# Patient Record
Sex: FEMALE | Race: WHITE | NOT HISPANIC OR LATINO | Employment: FULL TIME | URBAN - METROPOLITAN AREA
[De-identification: names, ages, dates, MRNs, and addresses within clinical notes are randomized per-mention and may not be internally consistent; named-entity substitution may affect disease eponyms.]

---

## 2023-02-02 ENCOUNTER — TRANSFERRED RECORDS (OUTPATIENT)
Dept: MULTI SPECIALTY CLINIC | Facility: CLINIC | Age: 23
End: 2023-02-02
Payer: COMMERCIAL

## 2023-02-02 LAB
C TRACH DNA SPEC QL PROBE+SIG AMP: NEGATIVE
HEP C HIM: NORMAL
HIV 1&2 EXT: NORMAL
N GONORRHOEA DNA SPEC QL PROBE+SIG AMP: NEGATIVE
SPECIMEN DESCRIP: NORMAL
SPECIMEN DESCRIPTION: NORMAL

## 2023-02-06 NOTE — PROGRESS NOTES
"  Assessment & Plan     1. Deviated nasal septum    - Adult ENT  Referral; Future    2. Initiation of OCP (BCP)    - desogestrel-ethinyl estradiol (APRI) 0.15-30 MG-MCG tablet; Take 1 tablet by mouth daily  Dispense: 84 tablet; Refill: 3   The risks, benefits and treatment options of prescribed medications or other treatments have been discussed with the patient. The patient verbalized their understanding and should call or follow up if no improvement or if they develop further problems   Patient is given an opportunity to ask questions and have them answered.    3. Advance care planning      4. Encounter for administration of vaccine  Flu shot      Return in about 4 weeks (around 3/7/2023) for Follow up, Routine preventive, in person.    Beatriz Alfaro MD  [facility] MAYANK Cross is a 23 year old, presenting for the following health issues:  Breathing Problem and Contraception      History of Present Illness       Reason for visit:  Deviated septum, ingrown nail       Description: deviated septum  Duration: ongoing  Treatments tried: none    Description: birth control  Has not been on medication in the past. Would like to discuss options     day1 of period today. She is sexually active and uses condom. She denies h/o of std. She does not smoke tobacco . She denies a history of migraine with aura or DVT  .         Please abstract the following data from this visit with this patient into the appropriate field in Epic:      Other Tests found in the patient's chart through Chart Review/Care Everywhere:    Chlamydia testing done on this date: 1999                  Review of Systems   Constitutional, HEENT, cardiovascular, pulmonary, gi and gu systems are negative, except as otherwise noted.      Objective    /74   Pulse 80   Temp 98.6  F (37  C) (Temporal)   Resp 16   Ht 1.66 m (5' 5.35\")   Wt 71.7 kg (158 lb)   LMP 02/07/2023 (Exact Date)   SpO2 100%   " BMI 26.01 kg/m    Body mass index is 26.01 kg/m .  Physical Exam   GENERAL: healthy, alert and no distress  NOSE: deviated septum to the left  RESP: lungs clear to auscultation - no rales, rhonchi or wheezes  CV: regular rate and rhythm, normal S1 S2,   ABDOMEN: soft, nontender, no hepatosplenomegaly, no masses and bowel sounds normal  MS: no gross musculoskeletal defects noted, no edema

## 2023-02-07 ENCOUNTER — OFFICE VISIT (OUTPATIENT)
Dept: FAMILY MEDICINE | Facility: CLINIC | Age: 23
End: 2023-02-07
Payer: COMMERCIAL

## 2023-02-07 VITALS
WEIGHT: 158 LBS | RESPIRATION RATE: 16 BRPM | BODY MASS INDEX: 26.33 KG/M2 | SYSTOLIC BLOOD PRESSURE: 130 MMHG | TEMPERATURE: 98.6 F | HEART RATE: 80 BPM | DIASTOLIC BLOOD PRESSURE: 74 MMHG | OXYGEN SATURATION: 100 % | HEIGHT: 65 IN

## 2023-02-07 DIAGNOSIS — Z71.89 ADVANCE CARE PLANNING: ICD-10-CM

## 2023-02-07 DIAGNOSIS — Z23 ENCOUNTER FOR ADMINISTRATION OF VACCINE: ICD-10-CM

## 2023-02-07 DIAGNOSIS — Z30.011 INITIATION OF OCP (BCP): Primary | ICD-10-CM

## 2023-02-07 DIAGNOSIS — J34.2 DEVIATED NASAL SEPTUM: ICD-10-CM

## 2023-02-07 PROCEDURE — 90686 IIV4 VACC NO PRSV 0.5 ML IM: CPT | Performed by: STUDENT IN AN ORGANIZED HEALTH CARE EDUCATION/TRAINING PROGRAM

## 2023-02-07 PROCEDURE — 99203 OFFICE O/P NEW LOW 30 MIN: CPT | Mod: 25 | Performed by: STUDENT IN AN ORGANIZED HEALTH CARE EDUCATION/TRAINING PROGRAM

## 2023-02-07 PROCEDURE — 90471 IMMUNIZATION ADMIN: CPT | Performed by: STUDENT IN AN ORGANIZED HEALTH CARE EDUCATION/TRAINING PROGRAM

## 2023-02-07 RX ORDER — DESOGESTREL AND ETHINYL ESTRADIOL 0.15-0.03
1 KIT ORAL DAILY
Qty: 84 TABLET | Refills: 3 | Status: SHIPPED | OUTPATIENT
Start: 2023-02-07 | End: 2024-01-17

## 2023-02-07 NOTE — Clinical Note
Please abstract the following data from this visit with this patient into the appropriate field in Epic:  Tests that can be patient reported without a hard copy:  {Quality Abstract List (Optional):623385}  Other Tests found in the patient's chart through Chart Review/Care Everywhere:  Chlamydia testing done on this date: hiv, hep c, g/c done by this group [facility] [place] on this date: 2/2/23  Note to Abstraction: If this section is blank, no results were found via Chart Review/Care Everywhere.

## 2023-05-09 ENCOUNTER — OFFICE VISIT (OUTPATIENT)
Dept: OTOLARYNGOLOGY | Facility: CLINIC | Age: 23
End: 2023-05-09
Payer: COMMERCIAL

## 2023-05-09 VITALS
RESPIRATION RATE: 16 BRPM | OXYGEN SATURATION: 100 % | DIASTOLIC BLOOD PRESSURE: 82 MMHG | HEART RATE: 83 BPM | SYSTOLIC BLOOD PRESSURE: 128 MMHG

## 2023-05-09 DIAGNOSIS — J34.2 NASAL SEPTAL DEVIATION: ICD-10-CM

## 2023-05-09 DIAGNOSIS — M95.0 NASAL DEFORMITY: ICD-10-CM

## 2023-05-09 DIAGNOSIS — J34.89 NASAL OBSTRUCTION: Primary | ICD-10-CM

## 2023-05-09 PROCEDURE — 99243 OFF/OP CNSLTJ NEW/EST LOW 30: CPT | Performed by: OTOLARYNGOLOGY

## 2023-05-09 NOTE — PROGRESS NOTES
I am seeing this patient in consultation for deviated nasal septum at the request of the provider Beatriz Vera    Chief Complaint - Nasal obstruction    History of Present Illness - America Estrada is a 23 year old female who presents for evaluation of nasal obstruction. The patient describes symptoms of left nasal obstruction for the past many years. The patient notes symptoms most predominately on the left side, and if she opens left nostril it helps. The patient notes no allergies. No history of nasal trauma. No prior history of nasal surgery. I personally reviewed the relevant clinical notes in Epic including the primary care providers note.     Past Medical History - healthy    Current Medications -   Current Outpatient Medications:      desogestrel-ethinyl estradiol (APRI) 0.15-30 MG-MCG tablet, Take 1 tablet by mouth daily, Disp: 84 tablet, Rfl: 3    Allergies - No Known Allergies    Social History -   Social History     Socioeconomic History     Marital status: Single   Tobacco Use     Smoking status: Never     Smokeless tobacco: Never   Vaping Use     Vaping status: Never Used     Review of Systems - As per HPI and PMHx, otherwise 7 system review of the head and neck is negative.      Physical Exam  General - The patient is in no distress. Alert and oriented to person and place, answers questions and cooperates with examination appropriately.   Neurologic - CN II-XII are grossly intact. No focal neurologic deficits.   Voice and Breathing - The patient was breathing comfortably without the use of accessory muscles. There was no wheezing, stridor, or stertor.  The patients voice was clear and strong.  Eyes - Extraocular movements intact. Sclera were not icteric or injected, conjunctiva were pink and moist.  Mouth - Examination of the oral cavity showed pink, healthy oral mucosa. No lesions or ulcerations noted.  The tongue was mobile and midline, and the dentition were in good condition.     Throat - The walls of the oropharynx were smooth, pink, moist, symmetric, and had no lesions or ulcerations.  The tonsillar pillars and soft palate were symmetric.  The uvula was midline on elevation.  Neck -  No erythema, masses or lymphadenopathy  Nose - External contour shows a caudal deviation, severe to the left causing the medial crura of the lower lateral cartilage to be displaced. She has a very narrow left external valve. Nasal mucosa is pink and moist with clear mucus. The turbinates are normal.  No polyps, masses, or purulence noted on examination.      A/P - America Estrada is a 23 year old female with nasal obstruction due to a nasal tip deformity and a caudal septal deviation to the left. This is too severe for medical treatment to help. She has no allergies or edema. I recommend surgery. However, given that the tip is involved and her lower lateral cartilage is displaced, this may require septorhinoplasty. I will refer her to Dr. Evans, facial plastics at the  of .     Smith Bruce MD  Otolaryngology  [facility]

## 2023-05-09 NOTE — LETTER
5/9/2023         RE: America Estrada  [address]  [place] [address]        Dear Colleague,    Thank you for referring your patient, America Estrada, to the [facility]. Please see a copy of my visit note below.    I am seeing this patient in consultation for deviated nasal septum at the request of the provider Beatriz Vera    Chief Complaint - Nasal obstruction    History of Present Illness - America Estrada is a 23 year old female who presents for evaluation of nasal obstruction. The patient describes symptoms of left nasal obstruction for the past many years. The patient notes symptoms most predominately on the left side, and if she opens left nostril it helps. The patient notes no allergies. No history of nasal trauma. No prior history of nasal surgery. I personally reviewed the relevant clinical notes in Epic including the primary care providers note.     Past Medical History - healthy    Current Medications -   Current Outpatient Medications:      desogestrel-ethinyl estradiol (APRI) 0.15-30 MG-MCG tablet, Take 1 tablet by mouth daily, Disp: 84 tablet, Rfl: 3    Allergies - No Known Allergies    Social History -   Social History     Socioeconomic History     Marital status: Single   Tobacco Use     Smoking status: Never     Smokeless tobacco: Never   Vaping Use     Vaping status: Never Used     Review of Systems - As per HPI and PMHx, otherwise 7 system review of the head and neck is negative.      Physical Exam  General - The patient is in no distress. Alert and oriented to person and place, answers questions and cooperates with examination appropriately.   Neurologic - CN II-XII are grossly intact. No focal neurologic deficits.   Voice and Breathing - The patient was breathing comfortably without the use of accessory muscles. There was no wheezing, stridor, or stertor.  The patients voice was clear and strong.  Eyes - Extraocular movements intact. Sclera were not icteric  or injected, conjunctiva were pink and moist.  Mouth - Examination of the oral cavity showed pink, healthy oral mucosa. No lesions or ulcerations noted.  The tongue was mobile and midline, and the dentition were in good condition.    Throat - The walls of the oropharynx were smooth, pink, moist, symmetric, and had no lesions or ulcerations.  The tonsillar pillars and soft palate were symmetric.  The uvula was midline on elevation.  Neck -  No erythema, masses or lymphadenopathy  Nose - External contour shows a caudal deviation, severe to the left causing the medial crura of the lower lateral cartilage to be displaced. She has a very narrow left external valve. Nasal mucosa is pink and moist with clear mucus. The turbinates are normal.  No polyps, masses, or purulence noted on examination.      A/P - America Estrada is a 23 year old female with nasal obstruction due to a nasal tip deformity and a caudal septal deviation to the left. This is too severe for medical treatment to help. She has no allergies or edema. I recommend surgery. However, given that the tip is involved and her lower lateral cartilage is displaced, this may require septorhinoplasty. I will refer her to Dr. Evans, facial plastics at the  of .     Smith Bruce MD  Otolaryngology  [facility]        Again, thank you for allowing me to participate in the care of your patient.        Sincerely,        Smith Bruce MD

## 2023-06-21 ENCOUNTER — NURSE TRIAGE (OUTPATIENT)
Dept: FAMILY MEDICINE | Facility: CLINIC | Age: 23
End: 2023-06-21
Payer: COMMERCIAL

## 2023-06-21 NOTE — TELEPHONE ENCOUNTER
"S-(situation): patient called for an appointment today for heart rate issues. Last happened 45 minutes ago, and she is not having symptoms now.    B-(background): patient stated that over the last 6 months she has had issues with her heart. She describes it as \"sometimes it feels like palpitations, sometimes it feels like it is racing, sometimes it feels like a \"jolt\", where it will stop her in her tracks, sometimes it is irregular beats.    A-(assessment): see above.  Patient stated that this occurs about 4 times per week or so, and can be about 2-3 x per day when it happens.  The jolt feeling lasts a second, the rest of her symptoms can be a few seconds to several minutes.  Sometimes if feels like the symptoms will stop, and then start again, then stop, and than starts again.  When this occurs, she stated that sometimes she has \"fuzzy\" vision, and tingling in her extremities.       R-(recommendations): Per protocol: patient to be seen in clinic today.  There are not any openings.  I advised patient of the symptoms of when to call 911, and go to ER.  See protocols below.  Also, advised patient if this happens again, to ER so they may catch this when it is happening.    No openings in clinic today.  Routed as a level 2 triage to PCP for advise.    Chaya LEAL BSN  Triage Nurse  [facility]: [facility]  Ph: [phone]            "

## 2023-06-21 NOTE — TELEPHONE ENCOUNTER
Reason for Disposition    Heart beating very rapidly (e.g., > 140 / minute) and not present now  (Exception: During exercise.)    Skipped or extra beat(s) and occurs 4 or more times per minute    Additional Information    Negative: Passed out (i.e., lost consciousness, collapsed and was not responding)    Negative: Shock suspected (e.g., cold/pale/clammy skin, too weak to stand, low BP, rapid pulse)     Advised patient to call 911 if these symptoms occur.  Patient stated understanding and agreeable with the plan of care.    Negative: Difficult to awaken or acting confused (e.g., disoriented, slurred speech)     Advised patient to call 911 if these symptoms occur.  Patient stated understanding and agreeable with the plan of care.    Negative: Visible sweat on face or sweat dripping down face     Advised patient to call 911 if these symptoms occur.  Patient stated understanding and agreeable with the plan of care.    Negative: Unable to walk, or can only walk with assistance (e.g., requires support)     Advised patient to call 911 if these symptoms occur.  Patient stated understanding and agreeable with the plan of care.    Negative: Dizziness, lightheadedness, or weakness and heart beating very rapidly (e.g., > 140 / minute)     Advised patient to call 911 if these symptoms occur.  Patient stated understanding and agreeable with the plan of care.    Negative: Dizziness, lightheadedness, or weakness and heart beating very slowly (e.g., < 50 / minute)     Advised patient to call 911 if these symptoms occur.  Patient stated understanding and agreeable with the plan of care.    Negative: Sounds like a life-threatening emergency to the triager    Negative: Chest pain     Advised patient to call 911 if these symptoms occur.  Patient stated understanding and agreeable with the plan of care.    Negative: Difficulty breathing     Advised patient to go to ER now if these symptoms occur.  Patient stated understanding and  "agreeable with the plan of care.    Negative: Dizziness, lightheadedness, or weakness     Advised patient to go to ER now if these symptoms occur.  Patient stated understanding and agreeable with the plan of care.    Negative: Heart beating very rapidly (e.g., > 140 / minute) and present now  (Exception: During exercise.)     Advised patient to go to ER now if these symptoms occur.  Patient stated understanding and agreeable with the plan of care.    Negative: Heart beating very slowly (e.g., < 50 / minute)  (Exception: Athlete and heart rate normal for caller.)     Advised patient to go to ER now if these symptoms occur.  Patient stated understanding and agreeable with the plan of care.    Negative: New or worsened shortness of breath with activity (dyspnea on exertion)     Advised patient to go to ER now if these symptoms occur.  Patient stated understanding and agreeable with the plan of care.    Negative: Patient sounds very sick or weak to the triager    Negative: Taking water pill (i.e., diuretic) or heart medication (e.g., digoxin)    Negative: History of hyperthyroidism or taking thyroid medication    Negative: Substance use (drug use) or misuse, known or suspected    Negative: ADHD and taking stimulant medication    Answer Assessment - Initial Assessment Questions  1. DESCRIPTION: \"Please describe your heart rate or heartbeat that you are having\" (e.g., fast/slow, regular/irregular, skipped or extra beats, \"palpitations\")      Sometimes it is a skipped beat, sometimes it feels like it is racing, sometimes it feels like a \"jolt\", where it will stop her in her tracks, sometimes it is irregular  2. ONSET: \"When did it start?\" (Minutes, hours or days)       Symptoms started around past 6 months  3. DURATION: \"How long does it last\" (e.g., seconds, minutes, hours)      A second to a couple of minutes  4. PATTERN \"Does it come and go, or has it been constant since it started?\"  \"Does it get worse with exertion?\"  " " \"Are you feeling it now?\"      See above.  I am not feeling it now, but it happened this morning at about 45 minutes ago  5. TAP: \"Using your hand, can you tap out what you are feeling on a chair or table in front of you, so that I can hear?\" (Note: not all patients can do this)        Heart rate is normal mow  6. HEART RATE: \"Can you tell me your heart rate?\" \"How many beats in 15 seconds?\"  (Note: not all patients can do this)        88  7. RECURRENT SYMPTOM: \"Have you ever had this before?\" If Yes, ask: \"When was the last time?\" and \"What happened that time?\"       For the last 6 months  8. CAUSE: \"What do you think is causing the palpitations?\"      No idea  9. CARDIAC HISTORY: \"Do you have any history of heart disease?\" (e.g., heart attack, angina, bypass surgery, angioplasty, arrhythmia)       denies  10. OTHER SYMPTOMS: \"Do you have any other symptoms?\" (e.g., dizziness, chest pain, sweating, difficulty breathing)        Sometimes I have fuzzy vision, and tingling in my fingers  11. PREGNANCY: \"Is there any chance you are pregnant?\" \"When was your last menstrual period?\"        denies    Protocols used: HEART RATE AND HEARTBEAT [id number]      "

## 2023-06-21 NOTE — TELEPHONE ENCOUNTER
Patient needs to be seen in the clinic today. If there is no available spot in the clinic today, I will recommend going to the ED symptom recurs.   She can also wait till tomorrow if symptom is stable.

## 2023-06-21 NOTE — TELEPHONE ENCOUNTER
There are not any available appointments in surrounding clinics today.    Called patient, advised she can go to  today, or wait until tomorrow for an appointment.  Patient would like an appointment tomorrow.  PCP is not in clinic tomorrow. Scheduled with Natacha Banks PA-C at arrival time of 10:20.  Advised patient if any symptoms reoccur today, go to ER for an evaluation.    Patient stated understanding and agreeable with the plan of care.     Chaya LEAL BSN  Triage Nurse  [facility]: [facility]

## 2023-06-22 ENCOUNTER — OFFICE VISIT (OUTPATIENT)
Dept: FAMILY MEDICINE | Facility: CLINIC | Age: 23
End: 2023-06-22
Payer: COMMERCIAL

## 2023-06-22 ENCOUNTER — ANCILLARY PROCEDURE (OUTPATIENT)
Dept: CARDIOLOGY | Facility: CLINIC | Age: 23
End: 2023-06-22
Attending: PHYSICIAN ASSISTANT
Payer: COMMERCIAL

## 2023-06-22 VITALS
TEMPERATURE: 97.7 F | RESPIRATION RATE: 22 BRPM | HEART RATE: 73 BPM | WEIGHT: 156.6 LBS | BODY MASS INDEX: 25.17 KG/M2 | DIASTOLIC BLOOD PRESSURE: 68 MMHG | OXYGEN SATURATION: 100 % | HEIGHT: 66 IN | SYSTOLIC BLOOD PRESSURE: 112 MMHG

## 2023-06-22 DIAGNOSIS — R00.2 PALPITATIONS: ICD-10-CM

## 2023-06-22 DIAGNOSIS — R00.2 PALPITATIONS: Primary | ICD-10-CM

## 2023-06-22 LAB
ANION GAP SERPL CALCULATED.3IONS-SCNC: 11 MMOL/L (ref 7–15)
BUN SERPL-MCNC: 11.4 MG/DL (ref 6–20)
CALCIUM SERPL-MCNC: 9.2 MG/DL (ref 8.6–10)
CHLORIDE SERPL-SCNC: 105 MMOL/L (ref 98–107)
CREAT SERPL-MCNC: 0.65 MG/DL (ref 0.51–0.95)
DEPRECATED HCO3 PLAS-SCNC: 24 MMOL/L (ref 22–29)
ERYTHROCYTE [DISTWIDTH] IN BLOOD BY AUTOMATED COUNT: 12.7 % (ref 10–15)
GFR SERPL CREATININE-BSD FRML MDRD: >90 ML/MIN/1.73M2
GLUCOSE SERPL-MCNC: 91 MG/DL (ref 70–99)
HCT VFR BLD AUTO: 39.8 % (ref 35–47)
HGB BLD-MCNC: 12.9 G/DL (ref 11.7–15.7)
MCH RBC QN AUTO: 29.5 PG (ref 26.5–33)
MCHC RBC AUTO-ENTMCNC: 32.4 G/DL (ref 31.5–36.5)
MCV RBC AUTO: 91 FL (ref 78–100)
PLATELET # BLD AUTO: 254 10E3/UL (ref 150–450)
POTASSIUM SERPL-SCNC: 4.5 MMOL/L (ref 3.4–5.3)
RBC # BLD AUTO: 4.38 10E6/UL (ref 3.8–5.2)
SODIUM SERPL-SCNC: 140 MMOL/L (ref 136–145)
TSH SERPL DL<=0.005 MIU/L-ACNC: 1 UIU/ML (ref 0.3–4.2)
WBC # BLD AUTO: 4.7 10E3/UL (ref 4–11)

## 2023-06-22 PROCEDURE — 93241 XTRNL ECG REC>48HR<7D: CPT | Performed by: INTERNAL MEDICINE

## 2023-06-22 PROCEDURE — 84443 ASSAY THYROID STIM HORMONE: CPT | Performed by: PHYSICIAN ASSISTANT

## 2023-06-22 PROCEDURE — 93000 ELECTROCARDIOGRAM COMPLETE: CPT | Performed by: PHYSICIAN ASSISTANT

## 2023-06-22 PROCEDURE — 85027 COMPLETE CBC AUTOMATED: CPT | Performed by: PHYSICIAN ASSISTANT

## 2023-06-22 PROCEDURE — 80048 BASIC METABOLIC PNL TOTAL CA: CPT | Performed by: PHYSICIAN ASSISTANT

## 2023-06-22 PROCEDURE — 36415 COLL VENOUS BLD VENIPUNCTURE: CPT | Performed by: PHYSICIAN ASSISTANT

## 2023-06-22 PROCEDURE — 99214 OFFICE O/P EST MOD 30 MIN: CPT | Performed by: PHYSICIAN ASSISTANT

## 2023-06-22 ASSESSMENT — ENCOUNTER SYMPTOMS
PALPITATIONS: 1
COUGH: 0
SHORTNESS OF BREATH: 0
CHILLS: 0
WEAKNESS: 0
WHEEZING: 0
FEVER: 0

## 2023-06-22 ASSESSMENT — PAIN SCALES - GENERAL: PAINLEVEL: NO PAIN (0)

## 2023-06-22 NOTE — PROGRESS NOTES
Zio patch placed on patient in clinic on 06/22/2023. Patient was instructed to wear patch for 7 days per provider.     Went through instructions with patient regarding care, booklet documentation, returning device, and contacting [employer] with problems with device.     Patient agreed with plan and was sent home with instructions, brochure, log book and pre addressed return box. Regla Woodall MA

## 2023-06-22 NOTE — PROGRESS NOTES
Assessment & Plan     Palpitations  Patient is a 23-year-old female who presents to clinic with mother due to heart palpitations intermittently ongoing for 2 years.  Patient notes some palpitations feel more like a jolt and others feel like a rapid heartbeat.  Vital signs normal.  Physical exam without acute abnormalities.  EKG completed and without signs of WPW or other worrisome arrhythmias.  Will complete labs to make sure no underlying electrolyte abnormalities, anemia, thyroid abnormalities. Symptoms are most consistent with PVCs or possibly SVT.  Will complete Zio patch for further evaluation.  Return and urgent follow-up precautions provided.  - CBC with platelets; Future  - Basic metabolic panel  (Ca, Cl, CO2, Creat, Gluc, K, Na, BUN); Future  - TSH with free T4 reflex; Future  - EKG 12-lead complete w/read - Clinics  - Adult Leadless EKG Monitor 3 to 7 Days; Future  - CBC with platelets  - Basic metabolic panel  (Ca, Cl, CO2, Creat, Gluc, K, Na, BUN)  - TSH with free T4 reflex      See Patient Instructions    Natacha Banks PA-C  [employer] MAYANK Cross is a 23 year old, presenting for the following health issues:  Heart Problem (Patient state she is having some heart rate issues. Patient state that it is all the time. Not specific to activity or doing anything specific. )        6/22/2023    10:36 AM   Additional Questions   Roomed by Regla PERRY MA   Accompanied by Mom         6/22/2023    10:36 AM   Patient Reported Additional Medications   Patient reports taking the following new medications None     Heart Problem  Pertinent negatives include no chest pain, chills, coughing, fever or weakness.   History of Present Illness       Reason for visit:  Heart concerns  Symptom onset:  More than a month  Symptoms include:  Jolting heart and racing heart  Symptom intensity:  Moderate  Symptom progression:  Worsening  Had these symptoms before:  Yes  Has tried/received treatment for  "these symptoms:  No    She eats 2-3 servings of fruits and vegetables daily.She consumes 0 sweetened beverage(s) daily.She exercises with enough effort to increase her heart rate 20 to 29 minutes per day.  She exercises with enough effort to increase her heart rate 4 days per week.   She is taking medications regularly.       Patient notes that for the past few years she has had episodes of heart jolting happening 2-3 times per week and many times per day when it occurs. She also notes racing that happens rarely. At times her peripheral vision will feel off and she will have tingling in the fingers. Mother was told her had PVC's in the past. Brother had EKG done recently and possible valve issues. No intervention was completed. No sudden cardiac death in family.     Review of Systems   Constitutional: Negative for chills and fever.   Respiratory: Negative for cough, shortness of breath and wheezing.    Cardiovascular: Positive for palpitations. Negative for chest pain and peripheral edema.   Neurological: Negative for weakness.            Objective    /68   Pulse 73   Temp 97.7  F (36.5  C) (Temporal)   Resp 22   Ht 1.685 m (5' 6.34\")   Wt 71 kg (156 lb 9.6 oz)   LMP 06/22/2023 (Exact Date)   SpO2 100%   BMI 25.02 kg/m    Body mass index is 25.02 kg/m .  Physical Exam  Vitals and nursing note reviewed.   Constitutional:       General: She is not in acute distress.     Appearance: Normal appearance.   HENT:      Head: Normocephalic and atraumatic.      Mouth/Throat:      Mouth: Mucous membranes are moist.      Pharynx: Oropharynx is clear.   Eyes:      Extraocular Movements: Extraocular movements intact.      Pupils: Pupils are equal, round, and reactive to light.   Cardiovascular:      Rate and Rhythm: Normal rate and regular rhythm.      Heart sounds: Normal heart sounds. No murmur heard.  Pulmonary:      Effort: Pulmonary effort is normal.      Breath sounds: Normal breath sounds. No wheezing, " rhonchi or rales.   Musculoskeletal:         General: Normal range of motion.      Cervical back: Normal range of motion.      Right lower leg: No edema.      Left lower leg: No edema.   Skin:     General: Skin is warm and dry.   Neurological:      General: No focal deficit present.      Mental Status: She is alert.   Psychiatric:         Mood and Affect: Mood normal.         Behavior: Behavior normal.            EKG - Reviewed and interpreted by me appears normal, NSR, normal axis, normal intervals, no acute ST/T changes c/w ischemia, no LVH by voltage criteria, there are no prior tracings available

## 2023-06-22 NOTE — PATIENT INSTRUCTIONS
For further evaluation of your heart palpitations we are completing lab work, an EKG, and a Zio patch monitor.  We will send results via [employer] once available if you have this set up.  If there are worrisome findings we will call you.  If your findings are normal and you do not have MyChart, we will send the results in the mail.  Please see attached handout about palpitations at the end of the packet.  Reach out with any questions or concerns.

## 2023-07-10 ENCOUNTER — TELEPHONE (OUTPATIENT)
Dept: FAMILY MEDICINE | Facility: CLINIC | Age: 23
End: 2023-07-10
Payer: COMMERCIAL

## 2023-07-10 NOTE — TELEPHONE ENCOUNTER
Patient Quality Outreach    Patient is due for the following:   Cervical Cancer Screening - PAP Needed  Physical Preventive Adult Physical      Topic Date Due     HPV Vaccine (1 - 2-dose series) Never done     COVID-19 Vaccine (3 - Moderna series) 07/30/2021     Diptheria Tetanus Pertussis (DTAP/TDAP/TD) Vaccine (6 - Td or Tdap) 09/16/2021       Next Steps:   Schedule a Adult Preventative    Type of outreach:    Sent Scayl message.      Questions for provider review:    None           Regla Woodall MA

## 2023-08-13 ENCOUNTER — HEALTH MAINTENANCE LETTER (OUTPATIENT)
Age: 23
End: 2023-08-13

## 2023-08-25 ENCOUNTER — OFFICE VISIT (OUTPATIENT)
Dept: PLASTIC SURGERY | Facility: CLINIC | Age: 23
End: 2023-08-25
Payer: COMMERCIAL

## 2023-08-25 DIAGNOSIS — J34.89 NASAL OBSTRUCTION: ICD-10-CM

## 2023-08-25 DIAGNOSIS — J34.2 DEVIATED NASAL SEPTUM: Primary | ICD-10-CM

## 2023-08-25 DIAGNOSIS — J34.2 NASAL SEPTAL DEVIATION: ICD-10-CM

## 2023-08-25 DIAGNOSIS — J34.829 NASAL VALVE COLLAPSE: ICD-10-CM

## 2023-08-25 NOTE — PROGRESS NOTES
Facial Plastic and Reconstructive Surgery Consultation    Referring Provider:   Smith Bruce MD  [address] [facility] [place] [address]    HPI:   I had the pleasure of seeing America Estrada today in clinic for consultation for nasal obstruction with septal deviation. America Estrada is a 23 year old female who was evaluated by Dr. Bruce who found a leftward anterior caudal septal deviation causing nasal obstruction. In addition, significant left nasal valve collapse. She has tried flonase in the past with no improvement in her nasal breathing. She denies a history of nasal trauma or nasal surgery.     Review Of Systems  ROS: 10 point ROS neg other than the symptoms noted above in the HPI.    There is no problem list on file for this patient.    No past surgical history on file.  Current Outpatient Medications   Medication Sig Dispense Refill    desogestrel-ethinyl estradiol (APRI) 0.15-30 MG-MCG tablet Take 1 tablet by mouth daily 84 tablet 3     Patient has no known allergies.  Social History     Socioeconomic History    Marital status: Single     Spouse name: Not on file    Number of children: Not on file    Years of education: Not on file    Highest education level: Not on file   Occupational History    Not on file   Tobacco Use    Smoking status: Never     Passive exposure: Never    Smokeless tobacco: Never   Vaping Use    Vaping Use: Never used   Substance and Sexual Activity    Alcohol use: Not on file    Drug use: Not on file    Sexual activity: Not on file   Other Topics Concern    Not on file   Social History Narrative    Not on file     Social Determinants of Health     Financial Resource Strain: Not on file   Food Insecurity: Not on file   Transportation Needs: Not on file   Physical Activity: Not on file   Stress: Not on file   Social Connections: Not on file   Intimate Partner Violence: Not on file   Housing Stability: Not on file     No family history on file.    PE:  Alert and Oriented,  Answering Questions Appropriately  Atraumatic, Normocephalic, Face Symmetric  Skin: Francois 2  Facial Nerve Intact and facial movement symmetric  EOM's, PEERL  Nasal Exam: left nostril is narrow and stenosed and caudal septum in sitting in the nasal valve space. Left midvault collapse, slight dorsal horns, anterior rhinoscopy, once the anterior caudal septum is passed on the left shows a posterior opening on the left with a rightward septal deviation, view on right shows septal obstruction on the right posteriorly, right anterior inferior turbinate is enlargedexternal Deformity, no mucopurulence or polyps, no masses  Chin: Normal   Lips/Teeth/Toungue/Gums: Lips intact, Normal Dentition, Occlusion intact, Oral mucosa intact, no lesions/ulcerations/masses, Tongue mobile  Neck: No lymphadenopathy, no thyromegaly, trachea midline  Chest: No wheezing, cyanosis, or stridor  Card: Normal upper extremity pulses and capillary refill, not diaphoretic  Neuro/Psych: CN's 2-12 intact, Moves all extremities, ambulation in intact, positive affect, no notable muscle weakness     IMPRESSION:    Diagnoses of Nasal obstruction and Nasal septal deviation were pertinent to this visit.    PLAN:    America Estrada presents today for consultation for nasal obstruction. She is significantly debilitated by the inability to effectively move air through her nose. There are visible structural deficits that would not be improved with medical therapy. The noted deformities on exam require surgical correction. These would not be addressed or corrected with a septoplasty alone, as the structural deficits arise in the dorsal L strut supportive aspect of the septum.     She will need an open complex septoplasty with caudal septal repositioning and grafts with nasal valve repair with bilateral  grafts. We discussed this surgery today. Her questions were answered.     Photodocumentation was obtained.     I spent a total of 45 minutes in the  care of patient America Estrada during today's office visit. This time includes reviewing the patient's chart and prior history, obtaining a history, performing an examination and evaluation and counseling the patient. This time also includes ordering mediations or tests necessary in addition to communication to other member's of the patient's health care team. Time spent in documentation and care coordination is included.

## 2023-08-25 NOTE — LETTER
8/25/2023       RE: America Estrada  [address] [place] [address]       Dear Colleague,    Thank you for referring your patient, America Estrada, to the [facility] at [facility]. Please see a copy of my visit note below.    Facial Plastic and Reconstructive Surgery Consultation    Referring Provider:   Smith Bruce MD  [address] [facility] [place] [address]    HPI:   I had the pleasure of seeing America Estrada today in clinic for consultation for nasal obstruction with septal deviation. America Estrada is a 23 year old female who was evaluated by Dr. Bruce who found a leftward anterior caudal septal deviation causing nasal obstruction. In addition, significant left nasal valve collapse. She has tried flonase in the past with no improvement in her nasal breathing. She denies a history of nasal trauma or nasal surgery.     Review Of Systems  ROS: 10 point ROS neg other than the symptoms noted above in the HPI.    There is no problem list on file for this patient.    No past surgical history on file.  Current Outpatient Medications   Medication Sig Dispense Refill    desogestrel-ethinyl estradiol (APRI) 0.15-30 MG-MCG tablet Take 1 tablet by mouth daily 84 tablet 3     Patient has no known allergies.  Social History     Socioeconomic History    Marital status: Single     Spouse name: Not on file    Number of children: Not on file    Years of education: Not on file    Highest education level: Not on file   Occupational History    Not on file   Tobacco Use    Smoking status: Never     Passive exposure: Never    Smokeless tobacco: Never   Vaping Use    Vaping Use: Never used   Substance and Sexual Activity    Alcohol use: Not on file    Drug use: Not on file    Sexual activity: Not on file   Other Topics Concern    Not on file   Social History Narrative    Not on file     Social Determinants of Health     Financial Resource Strain: Not on file    Food Insecurity: Not on file   Transportation Needs: Not on file   Physical Activity: Not on file   Stress: Not on file   Social Connections: Not on file   Intimate Partner Violence: Not on file   Housing Stability: Not on file     No family history on file.    PE:  Alert and Oriented, Answering Questions Appropriately  Atraumatic, Normocephalic, Face Symmetric  Skin: Francois 2  Facial Nerve Intact and facial movement symmetric  EOM's, PEERL  Nasal Exam: left nostril is narrow and stenosed and caudal septum in sitting in the nasal valve space. Left midvault collapse, slight dorsal horns, anterior rhinoscopy, once the anterior caudal septum is passed on the left shows a posterior opening on the left with a rightward septal deviation, view on right shows septal obstruction on the right posteriorly, right anterior inferior turbinate is enlargedexternal Deformity, no mucopurulence or polyps, no masses  Chin: Normal   Lips/Teeth/Toungue/Gums: Lips intact, Normal Dentition, Occlusion intact, Oral mucosa intact, no lesions/ulcerations/masses, Tongue mobile  Neck: No lymphadenopathy, no thyromegaly, trachea midline  Chest: No wheezing, cyanosis, or stridor  Card: Normal upper extremity pulses and capillary refill, not diaphoretic  Neuro/Psych: CN's 2-12 intact, Moves all extremities, ambulation in intact, positive affect, no notable muscle weakness     IMPRESSION:    Diagnoses of Nasal obstruction and Nasal septal deviation were pertinent to this visit.    PLAN:    America Estrada presents today for consultation for nasal obstruction. She is significantly debilitated by the inability to effectively move air through her nose. There are visible structural deficits that would not be improved with medical therapy. The noted deformities on exam require surgical correction. These would not be addressed or corrected with a septoplasty alone, as the structural deficits arise in the dorsal L strut supportive aspect of the  septum.     She will need an open complex septoplasty with caudal septal repositioning and grafts with nasal valve repair with bilateral  grafts. We discussed this surgery today. Her questions were answered.     Photodocumentation was obtained.     I spent a total of 45 minutes in the care of patient America Estrada during today's office visit. This time includes reviewing the patient's chart and prior history, obtaining a history, performing an examination and evaluation and counseling the patient. This time also includes ordering mediations or tests necessary in addition to communication to other member's of the patient's health care team. Time spent in documentation and care coordination is included.         Again, thank you for allowing me to participate in the care of your patient.      Sincerely,    Lucero Evans MD

## 2023-08-25 NOTE — LETTER
August 25, 2023  Re: America Estrada  2000    Dear Dr. Bruce,    Thank you so much for referring America Estrada to the [facility]. I had the pleasure of visiting with America today.     Attached you will find a copy of my note. Please feel free to reach out to me with any questions, [phone].     Facial Plastic and Reconstructive Surgery Consultation    Referring Provider:   Smtih Bruce MD  [address] [facility]  [place] [address]    HPI:   I had the pleasure of seeing America Estrada today in clinic for consultation for nasal obstruction with septal deviation. America Estrada is a 23 year old female who was evaluated by Dr. Bruce who found a leftward anterior caudal septal deviation causing nasal obstruction. In addition, significant left nasal valve collapse. She has tried flonase in the past with no improvement in her nasal breathing. She denies a history of nasal trauma or nasal surgery.     Review Of Systems  ROS: 10 point ROS neg other than the symptoms noted above in the HPI.    There is no problem list on file for this patient.    No past surgical history on file.  Current Outpatient Medications   Medication Sig Dispense Refill    desogestrel-ethinyl estradiol (APRI) 0.15-30 MG-MCG tablet Take 1 tablet by mouth daily 84 tablet 3     Patient has no known allergies.  Social History     Socioeconomic History    Marital status: Single     Spouse name: Not on file    Number of children: Not on file    Years of education: Not on file    Highest education level: Not on file   Occupational History    Not on file   Tobacco Use    Smoking status: Never     Passive exposure: Never    Smokeless tobacco: Never   Vaping Use    Vaping Use: Never used   Substance and Sexual Activity    Alcohol use: Not on file    Drug use: Not on file    Sexual activity: Not on file   Other Topics Concern    Not on file   Social History Narrative    Not on file     Social Determinants of Health     Financial Resource Strain:  Not on file   Food Insecurity: Not on file   Transportation Needs: Not on file   Physical Activity: Not on file   Stress: Not on file   Social Connections: Not on file   Intimate Partner Violence: Not on file   Housing Stability: Not on file     No family history on file.    PE:  Alert and Oriented, Answering Questions Appropriately  Atraumatic, Normocephalic, Face Symmetric  Skin: Francois 2  Facial Nerve Intact and facial movement symmetric  EOM's, PEERL  Nasal Exam: left nostril is narrow and stenosed and caudal septum in sitting in the nasal valve space. Left midvault collapse, slight dorsal horns, anterior rhinoscopy, once the anterior caudal septum is passed on the left shows a posterior opening on the left with a rightward septal deviation, view on right shows septal obstruction on the right posteriorly, right anterior inferior turbinate is enlargedexternal Deformity, no mucopurulence or polyps, no masses  Chin: Normal   Lips/Teeth/Toungue/Gums: Lips intact, Normal Dentition, Occlusion intact, Oral mucosa intact, no lesions/ulcerations/masses, Tongue mobile  Neck: No lymphadenopathy, no thyromegaly, trachea midline  Chest: No wheezing, cyanosis, or stridor  Card: Normal upper extremity pulses and capillary refill, not diaphoretic  Neuro/Psych: CN's 2-12 intact, Moves all extremities, ambulation in intact, positive affect, no notable muscle weakness     IMPRESSION:    Diagnoses of Nasal obstruction and Nasal septal deviation were pertinent to this visit.    PLAN:    America Estrada presents today for consultation for nasal obstruction. She is significantly debilitated by the inability to effectively move air through her nose. There are visible structural deficits that would not be improved with medical therapy. The noted deformities on exam require surgical correction. These would not be addressed or corrected with a septoplasty alone, as the structural deficits arise in the dorsal L strut supportive aspect  of the septum.     She will need an open complex septoplasty with caudal septal repositioning and grafts with nasal valve repair with bilateral  grafts. We discussed this surgery today. Her questions were answered.     Photodocumentation was obtained.     I spent a total of 45 minutes in the care of patient America Estrada during today's office visit. This time includes reviewing the patient's chart and prior history, obtaining a history, performing an examination and evaluation and counseling the patient. This time also includes ordering mediations or tests necessary in addition to communication to other member's of the patient's health care team. Time spent in documentation and care coordination is included.           Your trust in our practice and care is much appreciated.    Sincerely,    Lucero Evans MD

## 2023-08-31 DIAGNOSIS — J34.2 DEVIATED NASAL SEPTUM: ICD-10-CM

## 2023-08-31 DIAGNOSIS — J34.89 NASAL OBSTRUCTION: Primary | ICD-10-CM

## 2023-08-31 DIAGNOSIS — J34.3 HYPERTROPHY OF INFERIOR NASAL TURBINATE: ICD-10-CM

## 2023-08-31 DIAGNOSIS — J34.829 NASAL VALVE COLLAPSE: ICD-10-CM

## 2023-08-31 RX ORDER — CEFAZOLIN SODIUM 2 G/50ML
2 SOLUTION INTRAVENOUS
Status: CANCELLED | OUTPATIENT
Start: 2023-08-31

## 2023-08-31 RX ORDER — CEFAZOLIN SODIUM 2 G/50ML
2 SOLUTION INTRAVENOUS SEE ADMIN INSTRUCTIONS
Status: CANCELLED | OUTPATIENT
Start: 2023-08-31

## 2023-08-31 NOTE — NURSING NOTE
2D/3D Photodocumentation obtained.    Will work to obtain PA for nasal surgery.    Dariana Wodoard RN  8/31/2023 3:56 PM

## 2023-09-18 ENCOUNTER — TELEPHONE (OUTPATIENT)
Dept: OTOLARYNGOLOGY | Facility: CLINIC | Age: 23
End: 2023-09-18
Payer: COMMERCIAL

## 2023-09-18 NOTE — TELEPHONE ENCOUNTER
Called patient to schedule surgery with Dr. Evans. No answer, callback number [phone] left on  for patient.     Kenia Lilly on 9/18/2023 at 2:25 PM

## 2023-09-19 NOTE — TELEPHONE ENCOUNTER
Patient is scheduled for surgery with Dr. Evans.     Spoke with: Patient    Date of Surgery: 2/06/24.     Location: [facility]     Pre op with Provider: BRITT     H&P: Patient will be scheduling with Amery in [place]. Informed patient pre op will need to be done within 30 days of scheduled surgery date.     Additional imaging/appointments: Patient will be scheduled for a 1 week post op in [place].     Surgery packet: Will mail packet, confirmed with patient address in chart works best.      Additional comments: BRITT.         Kenia Lilly on 9/19/2023 at 12:22 PM

## 2023-09-27 ENCOUNTER — MEDICAL CORRESPONDENCE (OUTPATIENT)
Dept: HEALTH INFORMATION MANAGEMENT | Facility: CLINIC | Age: 23
End: 2023-09-27

## 2024-01-17 ENCOUNTER — OFFICE VISIT (OUTPATIENT)
Dept: FAMILY MEDICINE | Facility: CLINIC | Age: 24
End: 2024-01-17
Payer: COMMERCIAL

## 2024-01-17 VITALS
HEIGHT: 65 IN | TEMPERATURE: 98.2 F | SYSTOLIC BLOOD PRESSURE: 118 MMHG | HEART RATE: 95 BPM | BODY MASS INDEX: 23.89 KG/M2 | OXYGEN SATURATION: 100 % | RESPIRATION RATE: 18 BRPM | DIASTOLIC BLOOD PRESSURE: 68 MMHG | WEIGHT: 143.4 LBS

## 2024-01-17 DIAGNOSIS — J34.829 NASAL VALVE COLLAPSE: ICD-10-CM

## 2024-01-17 DIAGNOSIS — Z01.818 PRE-OP EVALUATION: Primary | ICD-10-CM

## 2024-01-17 DIAGNOSIS — J34.2 NASAL SEPTAL DEVIATION: ICD-10-CM

## 2024-01-17 PROCEDURE — 99214 OFFICE O/P EST MOD 30 MIN: CPT | Performed by: PHYSICIAN ASSISTANT

## 2024-01-17 NOTE — COMMUNITY RESOURCES LIST (ENGLISH)
01/17/2024   [facility]  N/A  For questions about this resource list or additional care needs, please contact your primary care clinic or care manager.  Phone: [phone]   Email: N/A   Address: [facility] [place] [address]   Hours: N/A        Hotlines and Helplines       Hotline - Housing crisis  1  [facility] Housing Resource Line Distance: 6.49 miles      Phone/Virtual   [address] [place] [address]  Language: English  Hours: Mon - Sun Open 24 Hours   Phone: (613) 976-7623 Website: https://[email or web address]/Basic-Needs     2  [facility] Distance: 15.63 miles      Phone/Virtual   [address] [place] [address]  Language: English  Hours: Mon - Sun Open 24 Hours   Phone: (612) 632-8023 Email: communications@[facility].org Website: [email or web address].[email or web address]/oursaviourshousing/          Housing       Coordinated Entry access point  3  [facility]  Office - [facility] Distance: 3.76 miles      Phone/Virtual   [address] [place] [address]  Language: English  Hours: Mon - Fri 8:30 AM - 12:00 PM , Mon - Fri 1:00 PM - 4:00 PM  Fees: Free   Phone: (950) 960-2723 Ext.2 Email: gregory@[email or web address].org Website: https://www.[facility]usa.org/usn/     4  [facility] ([facility] - Housing Services Distance: 15.75 miles      In-Person   [address] [place] [address]  Language: English  Hours: Mon - Fri 9:00 AM - 5:00 PM  Fees: Free   Phone: (682) 316-5435 Email: housing@[facility].org Website: http://www.[facility].org/housing     Drop-in center or day shelter  5  [employer] Distance: 14.1 miles      In-Person   [address] [place] [address]  Language: English, Hmong, Papua [place] Guinean, Ecuadorean  Hours: Mon - Thu 8:30 AM - 4:30 PM , Sat - Sun 9:00 AM - 12:00 PM  Fees: Free   Phone: (159) 289-6359 Email: info@[employer].org Website: https://[employer].org/     6  [facility]  [facility] Distance: 15.2 miles      In-Person   [address] [place] [address]  Language: English, Tongan, Guyanese  Hours: Mon - Sat 7:00 AM - 3:00 PM  Fees: Free, Self Pay   Phone: (879) 414-4688 Email: info@[employer] Website: https://www.[employer]/locations/opportunity-center/     Housing search assistance  7  [facility] [employer] ([facility]) - [facility] Rental Housing Distance: 3.76 miles      In-Person, Phone/Virtual   [address] [place] [address]  Language: English  Hours: Mon - Fri 8:00 AM - 4:30 PM  Fees: Free   Phone: (652) 438-5074 Email: accap@[facility].org Website: http://www.[employer]ap.[employer]Systems     8  Neighborhood Assistance [employer] Distance: 8.55 miles      Phone/Virtual   [address] [facility] [address] [place] [address]  Language: English, Guyanese  Hours: Mon - Fri 9:00 AM - 5:00 PM  Fees: Free   Phone: (339) 974-5893 Email: services@[employer] Website: https://www.[employer]     Shelter for families  9  [facility] Distance: 12.85 miles      In-Person   [address] [place] [address]  Language: English  Hours: Mon - Fri 3:00 PM - 9:00 AM , Sat - Sun Open 24 Hours  Fees: Free   Phone: (971) 733-5410 Ext.1 Website: https://www.[employer].org/2020/07/03/emergency-family-shelter/     Shelter for individuals  10  [facility] Distance: 14.44 miles      In-Person   [address] [place] [address]  Language: English  Hours: Mon - Fri 4:00 PM - 9:00 AM  Fees: Free   Phone: (584) 103-8429 Email: lisbeth@[facility].org Website: https://[facility].org/[facility]/     11  [facility] Distance: 15.63 miles      In-Person   [address] [place] [address]  Language: English  Hours: Mon - Sun Open 24 Hours  Fees: Free   Phone: (408) 479-4869 Email: communications@[facility].org  Website: https://[facility].org/oursaviourshousing/          Important Numbers & Websites       Emergency Services   911  [facility]   311  Poison Control   (383) 616-8661  Suicide Prevention Lifeline   (925) 857-7737 (TALK)  Child Abuse Hotline   (414) 661-2124 (4-A-Child)  Sexual Assault Hotline   (216) 527-2826 (HOPE)  National Runaway Safeline   (843) 320-4628 (RUNAWAY)  All-Options Talkline   (864) 799-9087  Substance Abuse Referral   (262) 113-5031 (HELP)

## 2024-01-17 NOTE — PROGRESS NOTES
Preoperative Evaluation  [facility] [place]  [address]  [place] [address]  Phone: [phone]  Primary Provider: Beatriz Alfaro  Pre-op Performing Provider: DEDRICK WRIGHT 17, 2024       America is a 23 year old, presenting for the following:  Pre-Op Exam        1/17/2024     8:47 AM   Additional Questions   Roomed by Galina   Accompanied by genie         1/17/2024     8:47 AM   Patient Reported Additional Medications   Patient reports taking the following new medications none     Surgical Information  Surgery/Procedure: Septoplasty   Surgery Location: [facility] OR   Surgeon: Nathan  Surgery Date: 2/6/24  Time of Surgery: unknown  Where patient plans to recover: At home with family  Fax number for surgical facility: [phone]    Assessment & Plan     The proposed surgical procedure is considered INTERMEDIATE risk.      ICD-10-CM    1. Pre-op evaluation  Z01.818       2. Nasal septal deviation  J34.2       3. Nasal valve collapse  M95.0             No diagnostics necessary     - No identified additional risk factors other than previously addressed    Antiplatelet or Anticoagulation Medication Instructions   - Patient is on no antiplatelet or anticoagulation medications.    Additional Medication Instructions  Patient is on no additional chronic medications    Recommendation  APPROVAL GIVEN to proceed with proposed procedure, without further diagnostic evaluation.      Subjective       HPI related to upcoming procedure: Septal deviation, nasal valve collapse         1/17/2024     8:35 AM   Preop Questions   1. Have you ever had a heart attack or stroke? No   2. Have you ever had surgery on your heart or blood vessels, such as a stent placement, a coronary artery bypass, or surgery on an artery in your head, neck, heart, or legs? No   3. Do you have chest pain with activity? No   4. Do you have a history of  heart failure? No   5. Do you currently have a  cold, bronchitis or symptoms of other infection? No   6. Do you have a cough, shortness of breath, or wheezing? No   7. Do you or anyone in your family have previous history of blood clots? No   8. Do you or does anyone in your family have a serious bleeding problem such as prolonged bleeding following surgeries or cuts? No   9. Have you ever had problems with anemia or been told to take iron pills? No   10. Have you had any abnormal blood loss such as black, tarry or bloody stools, or abnormal vaginal bleeding? No   11. Have you ever had a blood transfusion? No   12. Are you willing to have a blood transfusion if it is medically needed before, during, or after your surgery? Yes   13. Have you or any of your relatives ever had problems with anesthesia? No   14. Do you have sleep apnea, excessive snoring or daytime drowsiness? No   15. Do you have any artifical heart valves or other implanted medical devices like a pacemaker, defibrillator, or continuous glucose monitor? No   16. Do you have artificial joints? No   17. Are you allergic to latex? No   18. Is there any chance that you may be pregnant? No       Health Care Directive  Patient does not have a Health Care Directive or Living Will: Discussed advance care planning with patient; however, patient declined at this time.    Preoperative Review of   Not reviewed, no active or recent prescriptions in Epic       Status of Chronic Conditions:  See problem list for active medical problems.  Problems all longstanding and stable, except as noted/documented.  See ROS for pertinent symptoms related to these conditions.    Patient Active Problem List    Diagnosis Date Noted    Nasal valve collapse 08/25/2023     Priority: Medium    Deviated nasal septum 08/25/2023     Priority: Medium    Nasal septal deviation 08/25/2023     Priority: Medium    Nasal obstruction 08/25/2023     Priority: Medium      No past medical history on file.  Past Surgical History:   Procedure  "Laterality Date    [place] Teeth Extraction  2016     Current Outpatient Medications   Medication Sig Dispense Refill    desogestrel-ethinyl estradiol (APRI) 0.15-30 MG-MCG tablet Take 1 tablet by mouth daily 84 tablet 3       No Known Allergies     Social History     Tobacco Use    Smoking status: Never     Passive exposure: Never    Smokeless tobacco: Never   Substance Use Topics    Alcohol use: Not on file     No family history on file.  History   Drug Use Not on file         Objective    /68   Pulse 95   Temp 98.2  F (36.8  C) (Temporal)   Resp 18   Ht 1.66 m (5' 5.35\")   Wt 65 kg (143 lb 6.4 oz)   LMP 01/08/2024 (Approximate)   SpO2 100%   BMI 23.61 kg/m     Estimated body mass index is 23.61 kg/m  as calculated from the following:    Height as of this encounter: 1.66 m (5' 5.35\").    Weight as of this encounter: 65 kg (143 lb 6.4 oz).  Review of Systems  Constitutional, neuro, ENT, endocrine, pulmonary, cardiac, gastrointestinal, genitourinary, musculoskeletal, integument and psychiatric systems are negative, except as otherwise noted.  Physical Exam  GENERAL: alert and no distress  EYES: Eyes grossly normal to inspection  HENT: ear canals and TM's normal, nose and mouth without ulcers or lesions  NECK: no adenopathy, no asymmetry, masses, or scars  RESP: lungs clear to auscultation - no rales, rhonchi or wheezes  CV: regular rate and rhythm, normal S1 S2, no S3 or S4, no murmur, click or rub, no peripheral edema  ABDOMEN: soft, nontender, no hepatosplenomegaly, no masses and bowel sounds normal  MS: no gross musculoskeletal defects noted, no edema  SKIN: no suspicious lesions or rashes  NEURO: Normal strength and tone, mentation intact and speech normal  PSYCH: mentation appears normal, affect normal/bright  LYMPH: no cervical, supraclavicular, axillary, or inguinal adenopathy    Recent Labs   Lab Test 06/22/23  1140   HGB 12.9         POTASSIUM 4.5   CR 0.65        Diagnostics  No " labs were ordered during this visit.   No EKG required, no history of coronary heart disease, significant arrhythmia, peripheral arterial disease or other structural heart disease.    Revised Cardiac Risk Index (RCRI)  The patient has the following serious cardiovascular risks for perioperative complications:   - No serious cardiac risks = 0 points     RCRI Interpretation: 0 points: Class I (very low risk - 0.4% complication rate)         Signed Electronically by: Caty Rodrigues PA-C  Copy of this evaluation report is provided to requesting physician.

## 2024-02-06 ENCOUNTER — TRANSFERRED RECORDS (OUTPATIENT)
Dept: HEALTH INFORMATION MANAGEMENT | Facility: CLINIC | Age: 24
End: 2024-02-06
Payer: COMMERCIAL

## 2024-02-06 DIAGNOSIS — Z98.890 POSTOPERATIVE STATE: Primary | ICD-10-CM

## 2024-02-06 RX ORDER — OXYCODONE HYDROCHLORIDE 5 MG/1
5 TABLET ORAL EVERY 6 HOURS PRN
Qty: 20 TABLET | Refills: 0 | Status: SHIPPED | OUTPATIENT
Start: 2024-02-06 | End: 2024-09-06

## 2024-02-06 RX ORDER — ONDANSETRON 4 MG/1
4 TABLET, ORALLY DISINTEGRATING ORAL EVERY 8 HOURS PRN
Qty: 12 TABLET | Refills: 0 | Status: SHIPPED | OUTPATIENT
Start: 2024-02-06 | End: 2024-09-06

## 2024-02-06 RX ORDER — ACETAMINOPHEN 500 MG
500 TABLET ORAL EVERY 6 HOURS
Qty: 12 TABLET | Refills: 0 | Status: SHIPPED | OUTPATIENT
Start: 2024-02-06 | End: 2024-02-09

## 2024-02-06 RX ORDER — ECHINACEA PURPUREA EXTRACT 125 MG
2 TABLET ORAL 4 TIMES DAILY
Qty: 60 ML | Refills: 3 | Status: SHIPPED | OUTPATIENT
Start: 2024-02-06 | End: 2024-09-06

## 2024-02-12 ENCOUNTER — ALLIED HEALTH/NURSE VISIT (OUTPATIENT)
Dept: PLASTIC SURGERY | Facility: CLINIC | Age: 24
End: 2024-02-12
Payer: COMMERCIAL

## 2024-02-12 DIAGNOSIS — Z98.890 POSTOPERATIVE STATE: Primary | ICD-10-CM

## 2024-02-12 NOTE — PROGRESS NOTES
Pt is POD #6 s/p Septoplasty, Repair of Nasal Vestibular Stenosis.    Nasal cast and bilateral Best splints removed. Pt's nose is appropriately swollen and tender. Mild bilateral periorbital edema present.    Bilateral nasal passageways suctioned of excess mucous and crusting.    Pt very pleased with nasal breathing post splint removal and even became emotional at the first deep breath through her nose.    Instructed to continue with frequent use of nasal saline spray.    Follow-up in 4-6 wks.    Dariana Woodard RN  2/12/2024 11:33 AM

## 2024-03-08 ENCOUNTER — ALLIED HEALTH/NURSE VISIT (OUTPATIENT)
Dept: PLASTIC SURGERY | Facility: CLINIC | Age: 24
End: 2024-03-08
Payer: COMMERCIAL

## 2024-03-08 DIAGNOSIS — Z98.890 POSTOPERATIVE STATE: Primary | ICD-10-CM

## 2024-03-12 NOTE — PROGRESS NOTES
Pt is PO 2/6/24 s/p Septoplasty, Repair of Nasal Vestibular Stenosis.    Nasal swelling and tenderness has improved since last visit.  Pt says she feels somewhat obstructed on the left side and said she wishes she would've moved forward with the turbinate reduction.    Columella sutures have absorbed.  Septum is midline.    Follow-up with Dr. Evans in 2-3 mos.    Dariana Woodard RN  3/12/2024 3:07 PM

## 2024-09-03 ENCOUNTER — NURSE TRIAGE (OUTPATIENT)
Dept: FAMILY MEDICINE | Facility: CLINIC | Age: 24
End: 2024-09-03
Payer: COMMERCIAL

## 2024-09-03 NOTE — TELEPHONE ENCOUNTER
"Reason for Disposition    MILD rectal bleeding (more than just a few drops or streaks)    Additional Information    Negative: MODERATE rectal bleeding (small blood clots, passing blood without stool, or toilet water turns red)    Negative: Taking Coumadin (warfarin) or other strong blood thinner, or known bleeding disorder (e.g., thrombocytopenia)    Negative: Colonoscopy in past 72 hours    Negative: Known cirrhosis of the liver (or history of liver failure or ascites)    Negative: Patient wants to be seen    Negative: SEVERE abdominal pain (e.g., excruciating)    Negative: Constant abdominal pain lasting > 2 hours    Negative: Pale skin (pallor) of new-onset or worsening    Negative: Patient sounds very sick or weak to the triager    Negative: SEVERE rectal bleeding (large blood clots; constant or on and off bleeding)    Negative: SEVERE dizziness (e.g., unable to stand, requires support to walk, feels like passing out now)    Negative: MODERATE rectal bleeding (small blood clots, passing blood without stool, or toilet water turns red) more than once a day    Negative: Bloody, black, or tarry bowel movements  (Exception: Chronic-unchanged black-grey bowel movements and is taking iron pills or Pepto-Bismol.)    Negative: High-risk adult (e.g., prior surgery on aorta, abdominal aortic aneurysm)    Negative: Rectal foreign body (inserted or swallowed)    Negative: Diarrhea is main symptom    Negative: Rectal symptoms    Negative: Passed out (i.e., fainted, collapsed and was not responding)    Negative: Shock suspected (e.g., cold/pale/clammy skin, too weak to stand, low BP, rapid pulse)    Negative: Vomiting red blood or black (coffee ground) material    Negative: Sounds like a life-threatening emergency to the triager    Answer Assessment - Initial Assessment Questions  1. APPEARANCE of BLOOD: \"What color is it?\" \"Is it passed separately, on the surface of the stool, or mixed in with the stool?\"       Bright red " "stool  2. AMOUNT: \"How much blood was passed?\"       Couple table spoons of blood  3. FREQUENCY: \"How many times has blood been passed with the stools?\"       Last 3 BMs  4. ONSET: \"When was the blood first seen in the stools?\" (Days or weeks)       2 days ago  5. DIARRHEA: \"Is there also some diarrhea?\" If Yes, ask: \"How many diarrhea stools in the past 24 hours?\"       No  6. CONSTIPATION: \"Do you have constipation?\" If Yes, ask: \"How bad is it?\"      No  7. RECURRENT SYMPTOMS: \"Have you had blood in your stools before?\" If Yes, ask: \"When was the last time?\" and \"What happened that time?\"       Yes, but not to this extent  8. BLOOD THINNERS: \"Do you take any blood thinners?\" (e.g., Coumadin/warfarin, Pradaxa/dabigatran, aspirin)      No  9. OTHER SYMPTOMS: \"Do you have any other symptoms?\"  (e.g., abdomen pain, vomiting, dizziness, fever)      No, does have hemorrhoids  10. PREGNANCY: \"Is there any chance you are pregnant?\" \"When was your last menstrual period?\"        No, just got done 4 days ago    Protocols used: Rectal Bleeding-A-OH    "

## 2024-09-03 NOTE — TELEPHONE ENCOUNTER
Patient has had 3 episodes over past few days with  blood apparent in her stools.  Largest blood noted 2 days ago, about a couple tablespoons per patient.  Denies abd pain, fever, diarrhea or other symptoms.  Does have history of hemorrhoids but feels this time has been more blood.  She was scheduled for Friday prior to transferring to writer and appointment is appropriate.

## 2024-09-06 ENCOUNTER — OFFICE VISIT (OUTPATIENT)
Dept: FAMILY MEDICINE | Facility: CLINIC | Age: 24
End: 2024-09-06
Payer: COMMERCIAL

## 2024-09-06 VITALS
BODY MASS INDEX: 24.49 KG/M2 | WEIGHT: 147 LBS | SYSTOLIC BLOOD PRESSURE: 116 MMHG | TEMPERATURE: 98.3 F | HEART RATE: 85 BPM | DIASTOLIC BLOOD PRESSURE: 77 MMHG | HEIGHT: 65 IN | OXYGEN SATURATION: 99 % | RESPIRATION RATE: 16 BRPM

## 2024-09-06 DIAGNOSIS — K92.1 BLOOD IN STOOL: Primary | ICD-10-CM

## 2024-09-06 LAB
ALBUMIN SERPL BCG-MCNC: 4.9 G/DL (ref 3.5–5.2)
ALP SERPL-CCNC: 54 U/L (ref 40–150)
ALT SERPL W P-5'-P-CCNC: 15 U/L (ref 0–50)
ANION GAP SERPL CALCULATED.3IONS-SCNC: 9 MMOL/L (ref 7–15)
AST SERPL W P-5'-P-CCNC: 20 U/L (ref 0–45)
BASOPHILS # BLD AUTO: 0 10E3/UL (ref 0–0.2)
BASOPHILS NFR BLD AUTO: 1 %
BILIRUB SERPL-MCNC: 0.3 MG/DL
BUN SERPL-MCNC: 14.2 MG/DL (ref 6–20)
CALCIUM SERPL-MCNC: 9.6 MG/DL (ref 8.8–10.4)
CHLORIDE SERPL-SCNC: 103 MMOL/L (ref 98–107)
CREAT SERPL-MCNC: 0.79 MG/DL (ref 0.51–0.95)
CRP SERPL-MCNC: <3 MG/L
EGFRCR SERPLBLD CKD-EPI 2021: >90 ML/MIN/1.73M2
EOSINOPHIL # BLD AUTO: 0 10E3/UL (ref 0–0.7)
EOSINOPHIL NFR BLD AUTO: 1 %
ERYTHROCYTE [DISTWIDTH] IN BLOOD BY AUTOMATED COUNT: 12.2 % (ref 10–15)
ERYTHROCYTE [SEDIMENTATION RATE] IN BLOOD BY WESTERGREN METHOD: 5 MM/HR (ref 0–20)
GLUCOSE SERPL-MCNC: 99 MG/DL (ref 70–99)
HCO3 SERPL-SCNC: 25 MMOL/L (ref 22–29)
HCT VFR BLD AUTO: 41.7 % (ref 35–47)
HGB BLD-MCNC: 13.9 G/DL (ref 11.7–15.7)
IMM GRANULOCYTES # BLD: 0 10E3/UL
IMM GRANULOCYTES NFR BLD: 0 %
LYMPHOCYTES # BLD AUTO: 1.2 10E3/UL (ref 0.8–5.3)
LYMPHOCYTES NFR BLD AUTO: 28 %
MCH RBC QN AUTO: 30.2 PG (ref 26.5–33)
MCHC RBC AUTO-ENTMCNC: 33.3 G/DL (ref 31.5–36.5)
MCV RBC AUTO: 91 FL (ref 78–100)
MONOCYTES # BLD AUTO: 0.3 10E3/UL (ref 0–1.3)
MONOCYTES NFR BLD AUTO: 7 %
NEUTROPHILS # BLD AUTO: 2.8 10E3/UL (ref 1.6–8.3)
NEUTROPHILS NFR BLD AUTO: 63 %
PLATELET # BLD AUTO: 268 10E3/UL (ref 150–450)
POTASSIUM SERPL-SCNC: 5.3 MMOL/L (ref 3.4–5.3)
PROT SERPL-MCNC: 7.6 G/DL (ref 6.4–8.3)
RBC # BLD AUTO: 4.61 10E6/UL (ref 3.8–5.2)
SODIUM SERPL-SCNC: 137 MMOL/L (ref 135–145)
WBC # BLD AUTO: 4.5 10E3/UL (ref 4–11)

## 2024-09-06 PROCEDURE — 36415 COLL VENOUS BLD VENIPUNCTURE: CPT | Performed by: FAMILY MEDICINE

## 2024-09-06 PROCEDURE — 85652 RBC SED RATE AUTOMATED: CPT | Performed by: FAMILY MEDICINE

## 2024-09-06 PROCEDURE — 80053 COMPREHEN METABOLIC PANEL: CPT | Performed by: FAMILY MEDICINE

## 2024-09-06 PROCEDURE — 99214 OFFICE O/P EST MOD 30 MIN: CPT | Performed by: FAMILY MEDICINE

## 2024-09-06 PROCEDURE — 86140 C-REACTIVE PROTEIN: CPT | Performed by: FAMILY MEDICINE

## 2024-09-06 PROCEDURE — 85025 COMPLETE CBC W/AUTO DIFF WBC: CPT | Performed by: FAMILY MEDICINE

## 2024-09-06 NOTE — PROGRESS NOTES
"  Assessment & Plan   Problem List Items Addressed This Visit    None  Visit Diagnoses       Blood in stool    -  Primary    Relevant Orders    CRP, inflammation    Comprehensive metabolic panel (BMP + Alb, Alk Phos, ALT, AST, Total. Bili, TP)    ESR: Erythrocyte sedimentation rate    CBC with platelets and differential    Adult GI  Referral - Procedure Only           See lab workup to rule out IBD - appreciate GI endoscopy findings. She declined physical exam today.      John Cross is a 24 year old, presenting for the following health issues:  Rectal Problem (Off and on for a while. Last time was a week and a half ago lasting 3-4 days. When it happens it looks like she has a period in the toilet with the amount of blood in the toilet.)        9/6/2024    11:19 AM   Additional Questions   Roomed by Nat STEPHENSON CMA     History of Present Illness       Reason for visit:  Blood when having a BM  Symptom onset:  1-2 weeks ago  Symptom intensity:  Mild  Symptom progression:  Worsening  Had these symptoms before:  Yes  Has tried/received treatment for these symptoms:  No      Blood in the toilet, heavy on the toilet paper  Usually lasts 3-4 days at a time monthly  Says stooling causes pain      Objective    /77 (BP Location: Right arm, Patient Position: Chair, Cuff Size: Adult Regular)   Pulse 85   Temp 98.3  F (36.8  C) (Temporal)   Resp 16   Ht 1.66 m (5' 5.35\")   Wt 66.7 kg (147 lb)   LMP 08/24/2024 (Exact Date)   SpO2 99%   BMI 24.20 kg/m    Body mass index is 24.2 kg/m .  Physical Exam   Gen NAD  She declined additional physical exam today    anxious        Signed Electronically by: DHARA PRATT DO    "

## 2024-09-10 ENCOUNTER — TELEPHONE (OUTPATIENT)
Dept: GASTROENTEROLOGY | Facility: CLINIC | Age: 24
End: 2024-09-10
Payer: COMMERCIAL

## 2024-09-10 NOTE — TELEPHONE ENCOUNTER
"Endoscopy Scheduling Screen    Have you had a positive Covid test in the last 14 days?  No    What is your communication preference for Instructions and/or Bowel Prep?   MyChart    What insurance is in the chart?  Other:  [facility]    Ordering/Referring Provider: Berny Boland,    (If ordering provider performs procedure, schedule with ordering provider unless otherwise instructed. )    BMI: Estimated body mass index is 24.2 kg/m  as calculated from the following:    Height as of 9/6/24: 1.66 m (5' 5.35\").    Weight as of 9/6/24: 66.7 kg (147 lb).     Sedation Ordered  MAC/deep sedation.   BMI<= 45 45 < BMI <= 48 48 < BMI < = 50  BMI > 50   No Restrictions No [facility]  No [facility]  [facility] with exceptions Hospital Only OR Only       Do you have a history of malignant hyperthermia?  No    (Females) Are you currently pregnant?   No     Have you been diagnosed or told you have pulmonary hypertension?   No    Do you have an LVAD?  No    Have you been told you have moderate to severe sleep apnea?  No    Have you been told you have COPD, asthma, or any other lung disease?  No    Do you have any heart conditions?  No     Have you ever had or are you waiting for an organ transplant?  No. Continue scheduling, no site restrictions.    Have you had a stroke or transient ischemic attack (TIA aka \"mini stroke\" in the last 6 months?   No    Have you been diagnosed with or been told you have cirrhosis of the liver?   No    Are you currently on dialysis?   No    Do you need assistance transferring?   No    BMI: Estimated body mass index is 24.2 kg/m  as calculated from the following:    Height as of 9/6/24: 1.66 m (5' 5.35\").    Weight as of 9/6/24: 66.7 kg (147 lb).     Is patients BMI > 40 and scheduling location [facility]?  No    Do you take an injectable medication for weight loss or diabetes (excluding insulin)?  No    Do you take the medication Naltrexone?  No    Do you take blood thinners?  No       Prep   Are you currently on " dialysis or do you have chronic kidney disease?  No    Do you have a diagnosis of diabetes?  No    Do you have a diagnosis of cystic fibrosis (CF)?  No    On a regular basis do you go 3 -5 days between bowel movements?  No    BMI > 40?  No    Preferred Pharmacy:    [id number] [place] - [address]  [address] [address]  [place] [address]  Phone: [phone] Fax: [phone]      Final Scheduling Details     Procedure scheduled  Colonoscopy    Surgeon:  Irma     Date of procedure:  10/17/2024     Pre-OP / PAC:   No - Not required for this site.    Location  PH - Patient preference.    Sedation   MAC/Deep Sedation  location       Patient Reminders:   You will receive a call from a Nurse to review instructions and health history.  This assessment must be completed prior to your procedure.  Failure to complete the Nurse assessment may result in the procedure being cancelled.      On the day of your procedure, please designate an adult(s) who can drive you home stay with you for the next 24 hours. The medicines used in the exam will make you sleepy. You will not be able to drive.      You cannot take public transportation, ride share services, or non-medical taxi service without a responsible caregiver.  Medical transport services are allowed with the requirement that a responsible caregiver will receive you at your destination.  We require that drivers and caregivers are confirmed prior to your procedure.

## 2024-09-26 NOTE — TELEPHONE ENCOUNTER
Standard Miralax Bowel Prep recommended due to standard bowel prep. Instructions were sent via [employer].

## 2024-10-06 ENCOUNTER — HEALTH MAINTENANCE LETTER (OUTPATIENT)
Age: 24
End: 2024-10-06

## 2024-10-16 ENCOUNTER — ANESTHESIA EVENT (OUTPATIENT)
Dept: GASTROENTEROLOGY | Facility: CLINIC | Age: 24
End: 2024-10-16
Payer: COMMERCIAL

## 2024-10-16 RX ORDER — FLUMAZENIL 0.1 MG/ML
0.2 INJECTION, SOLUTION INTRAVENOUS
Status: CANCELLED | OUTPATIENT
Start: 2024-10-16 | End: 2024-10-17

## 2024-10-16 RX ORDER — NALOXONE HYDROCHLORIDE 0.4 MG/ML
0.2 INJECTION, SOLUTION INTRAMUSCULAR; INTRAVENOUS; SUBCUTANEOUS
Status: CANCELLED | OUTPATIENT
Start: 2024-10-16

## 2024-10-16 RX ORDER — NALOXONE HYDROCHLORIDE 0.4 MG/ML
0.4 INJECTION, SOLUTION INTRAMUSCULAR; INTRAVENOUS; SUBCUTANEOUS
Status: CANCELLED | OUTPATIENT
Start: 2024-10-16

## 2024-10-16 RX ORDER — PROCHLORPERAZINE MALEATE 5 MG/1
10 TABLET ORAL EVERY 6 HOURS PRN
Status: CANCELLED | OUTPATIENT
Start: 2024-10-16

## 2024-10-16 RX ORDER — ONDANSETRON 2 MG/ML
4 INJECTION INTRAMUSCULAR; INTRAVENOUS EVERY 6 HOURS PRN
Status: CANCELLED | OUTPATIENT
Start: 2024-10-16

## 2024-10-16 RX ORDER — ONDANSETRON 4 MG/1
4 TABLET, ORALLY DISINTEGRATING ORAL EVERY 6 HOURS PRN
Status: CANCELLED | OUTPATIENT
Start: 2024-10-16

## 2024-10-17 ENCOUNTER — HOSPITAL ENCOUNTER (OUTPATIENT)
Facility: CLINIC | Age: 24
Discharge: HOME | End: 2024-10-17
Attending: SURGERY | Admitting: SURGERY
Payer: COMMERCIAL

## 2024-10-17 ENCOUNTER — ANESTHESIA (OUTPATIENT)
Dept: GASTROENTEROLOGY | Facility: CLINIC | Age: 24
End: 2024-10-17
Payer: COMMERCIAL

## 2024-10-17 VITALS
TEMPERATURE: 98.3 F | OXYGEN SATURATION: 99 % | BODY MASS INDEX: 24.49 KG/M2 | WEIGHT: 147 LBS | HEART RATE: 75 BPM | SYSTOLIC BLOOD PRESSURE: 80 MMHG | HEIGHT: 65 IN | DIASTOLIC BLOOD PRESSURE: 55 MMHG | RESPIRATION RATE: 16 BRPM

## 2024-10-17 LAB — COLONOSCOPY: NORMAL

## 2024-10-17 PROCEDURE — 45385 COLONOSCOPY W/LESION REMOVAL: CPT | Performed by: SURGERY

## 2024-10-17 PROCEDURE — 250N000009 HC RX 250: Performed by: NURSE ANESTHETIST, CERTIFIED REGISTERED

## 2024-10-17 PROCEDURE — 88305 TISSUE EXAM BY PATHOLOGIST: CPT | Mod: 26 | Performed by: PATHOLOGY

## 2024-10-17 PROCEDURE — 88305 TISSUE EXAM BY PATHOLOGIST: CPT | Mod: TC | Performed by: SURGERY

## 2024-10-17 PROCEDURE — 250N000011 HC RX IP 250 OP 636: Performed by: NURSE ANESTHETIST, CERTIFIED REGISTERED

## 2024-10-17 PROCEDURE — 370N000017 HC ANESTHESIA TECHNICAL FEE, PER MIN: Performed by: SURGERY

## 2024-10-17 RX ORDER — LIDOCAINE 40 MG/G
CREAM TOPICAL
Status: DISCONTINUED | OUTPATIENT
Start: 2024-10-17 | End: 2024-10-17 | Stop reason: HOSPADM

## 2024-10-17 RX ORDER — ONDANSETRON 2 MG/ML
4 INJECTION INTRAMUSCULAR; INTRAVENOUS
Status: DISCONTINUED | OUTPATIENT
Start: 2024-10-17 | End: 2024-10-17 | Stop reason: HOSPADM

## 2024-10-17 RX ORDER — LIDOCAINE HYDROCHLORIDE 20 MG/ML
INJECTION, SOLUTION INFILTRATION; PERINEURAL PRN
Status: DISCONTINUED | OUTPATIENT
Start: 2024-10-17 | End: 2024-10-17

## 2024-10-17 RX ORDER — PROPOFOL 10 MG/ML
INJECTION, EMULSION INTRAVENOUS CONTINUOUS PRN
Status: DISCONTINUED | OUTPATIENT
Start: 2024-10-17 | End: 2024-10-17

## 2024-10-17 RX ORDER — PROPOFOL 10 MG/ML
INJECTION, EMULSION INTRAVENOUS PRN
Status: DISCONTINUED | OUTPATIENT
Start: 2024-10-17 | End: 2024-10-17

## 2024-10-17 RX ADMIN — PROPOFOL 100 MG: 10 INJECTION, EMULSION INTRAVENOUS at 09:33

## 2024-10-17 RX ADMIN — LIDOCAINE HYDROCHLORIDE 50 MG: 20 INJECTION, SOLUTION INFILTRATION; PERINEURAL at 09:33

## 2024-10-17 RX ADMIN — PROPOFOL 30 MG: 10 INJECTION, EMULSION INTRAVENOUS at 09:34

## 2024-10-17 RX ADMIN — PROPOFOL 200 MCG/KG/MIN: 10 INJECTION, EMULSION INTRAVENOUS at 09:34

## 2024-10-17 ASSESSMENT — ACTIVITIES OF DAILY LIVING (ADL)
ADLS_ACUITY_SCORE: 35
ADLS_ACUITY_SCORE: 35
ADLS_ACUITY_SCORE: 33

## 2024-10-17 NOTE — DISCHARGE INSTRUCTIONS
[facility]    Home Care Following Endoscopy          Activity:  You have just undergone an endoscopic procedure usually performed with conscious sedation.  Do not work or operate machinery (including a car) for at least 12 hours.    I encourage you to walk and attempt to pass this air as soon as possible.    Diet:  Return to the diet you were on before your procedure but eat lightly for the first 12-24 hours.  Drink plenty of water.  Resume any regular medications unless otherwise advised by your physician.  Please begin any new medication prescribed as a result of your procedure as directed by your physician.   If you had any biopsy or polyp removed please refrain from aspirin or aspirin products for 2 days.  If on Coumadin please restart as instructed by your physician.   Pain:  You may take Tylenol as needed for pain.  Expected Recovery:  You can expect some mild abdominal fullness and/or discomfort due to the air used to inflate your intestinal tract. It is also normal to have a mild sore throat after upper endoscopy.    Call Your Physician if You Have:    After Colonoscopy:  Worsening persisting abdominal pain which is worse with activity.  Fevers (>101 degrees F), chills or shakes.  Passage of continued blood with bowel movements.     Any questions or concerns about your recovery, please call [phone] or after hours [phone] ([phone]) Nurse Advice Line.    Follow-up Care:  If You did have polyps/biopsy tissue sample(s) removed.  The polyps/biopsy tissue sample(s) will be sent to pathology.    You should receive letter in your My Chart from Dr Solis with your results within 1-2 weeks. If you do not participate in My Chart a physical letter will come in the mail in 2-3 weeks.  Please call if you have not received a notification of your results.

## 2024-10-17 NOTE — ANESTHESIA CARE TRANSFER NOTE
Patient: America Estrada    Procedure: Procedure(s):  COLONOSCOPY, FLEXIBLE, WITH LESION REMOVAL USING SNARE       Diagnosis: Blood in stool [K92.1]  Diagnosis Additional Information: No value filed.    Anesthesia Type:   MAC     Note:    Oropharynx: oropharynx clear of all foreign objects and spontaneously breathing  Level of Consciousness: drowsy  Oxygen Supplementation: room air    Independent Airway: airway patency satisfactory and stable  Dentition: dentition unchanged  Vital Signs Stable: post-procedure vital signs reviewed and stable  Report to RN Given: handoff report given  Patient transferred to: PACU    Handoff Report: Identifed the Patient, Identified the Reponsible Provider, Reviewed the pertinent medical history, Discussed the surgical course, Reviewed Intra-OP anesthesia mangement and issues during anesthesia, Set expectations for post-procedure period and Allowed opportunity for questions and acknowledgement of understanding      Vitals:  Vitals Value Taken Time   BP 80/55 10/17/24 0950   Temp     Pulse 75 10/17/24 0950   Resp     SpO2 99 % 10/17/24 1010       Electronically Signed By: KATARINA Smith CRNA  October 17, 2024  11:48 AM

## 2024-10-17 NOTE — ANESTHESIA PREPROCEDURE EVALUATION
Anesthesia Pre-Procedure Evaluation    Patient: America Estrada   MRN: [id number] : 2000        Procedure : Procedure(s):  Colonoscopy          History reviewed. No pertinent past medical history.   Past Surgical History:   Procedure Laterality Date     [place] Teeth Extraction  2016      No Known Allergies   Social History     Tobacco Use     Smoking status: Never     Passive exposure: Never     Smokeless tobacco: Never   Substance Use Topics     Alcohol use: Not on file      Wt Readings from Last 1 Encounters:   24 66.7 kg (147 lb)        Anesthesia Evaluation   Pt has not had prior anesthetic         ROS/MED HX  ENT/Pulmonary:  - neg pulmonary ROS     Neurologic:  - neg neurologic ROS     Cardiovascular:  - neg cardiovascular ROS   (+)  - -   -  - -                                 Previous cardiac testing   Echo: Date: Results:    Stress Test:  Date: Results:    ECG Reviewed:  Date: 23 Results:  SR  Cath:  Date: Results:      METS/Exercise Tolerance:     Hematologic:  - neg hematologic  ROS     Musculoskeletal:  - neg musculoskeletal ROS     GI/Hepatic:  - neg GI/hepatic ROS     Renal/Genitourinary:  - neg Renal ROS     Endo:       Psychiatric/Substance Use:  - neg psychiatric ROS     Infectious Disease:  - neg infectious disease ROS     Malignancy:  - neg malignancy ROS     Other:  - neg other ROS        Physical Exam    Airway  airway exam normal      Mallampati: II   TM distance: > 3 FB   Neck ROM: full   Mouth opening: > 3 cm    Respiratory Devices and Support         Dental       (+) Completely normal teeth      Cardiovascular   cardiovascular exam normal       Rhythm and rate: regular and normal     Pulmonary   pulmonary exam normal        breath sounds clear to auscultation       OUTSIDE LABS:  CBC:   Lab Results   Component Value Date    WBC 4.5 2024    WBC 4.7 2023    HGB 13.9 2024    HGB 12.9 2023    HCT 41.7 2024    HCT 39.8 2023     2024  "    06/22/2023     BMP:   Lab Results   Component Value Date     09/06/2024     06/22/2023    POTASSIUM 5.3 09/06/2024    POTASSIUM 4.5 06/22/2023    CHLORIDE 103 09/06/2024    CHLORIDE 105 06/22/2023    CO2 25 09/06/2024    CO2 24 06/22/2023    BUN 14.2 09/06/2024    BUN 11.4 06/22/2023    CR 0.79 09/06/2024    CR 0.65 06/22/2023    GLC 99 09/06/2024    GLC 91 06/22/2023     COAGS: No results found for: \"PTT\", \"INR\", \"FIBR\"  POC: No results found for: \"BGM\", \"HCG\", \"HCGS\"  HEPATIC:   Lab Results   Component Value Date    ALBUMIN 4.9 09/06/2024    PROTTOTAL 7.6 09/06/2024    ALT 15 09/06/2024    AST 20 09/06/2024    ALKPHOS 54 09/06/2024    BILITOTAL 0.3 09/06/2024     OTHER:   Lab Results   Component Value Date    LOGAN 9.6 09/06/2024    TSH 1.00 06/22/2023    SED 5 09/06/2024       Anesthesia Plan    ASA Status:  1    NPO Status:  NPO Appropriate    Anesthesia Type: MAC.     - Reason for MAC: straight local not clinically adequate      Maintenance: TIVA.        Consents    Anesthesia Plan(s) and associated risks, benefits, and realistic alternatives discussed. Questions answered and patient/representative(s) expressed understanding.     - Discussed:     - Discussed with:  Patient       Use of blood products discussed: No .     Postoperative Care            Comments:    Other Comments: The risks and benefits of anesthesia, and the alternatives where applicable, have been discussed with the patient, and they wish to proceed.         Don Tran, APRN CRNA    I have reviewed the pertinent notes and labs in the chart from the past 30 days and (re)examined the patient.  Any updates or changes from those notes are reflected in this note.                                 "

## 2024-10-17 NOTE — LETTER
America Estrada  [address]  [place] [address]  October 23, 2024    Dear America,  This letter is to inform you of the results of your pathology report from your colonoscopy.  Your pathology report was:  Final Diagnosis   A.  Colon, Ascending, polyp, polypectomy:  -Sessile serrated adenoma  -Negative for conventional dysplasia and malignancy   These are benign polyps. These types of polyps do carry a small risk of developing into a cancer over time if not removed. Yours were completely removed at the time of your colonoscopy. You should have another surveillance colonoscopy in 7 years.  If you have further questions please don t hesitate to call our clinic at [phone].   Sincerely,     Edward Solis, DO

## 2024-10-17 NOTE — ANESTHESIA POSTPROCEDURE EVALUATION
Patient: America Estrada    Procedure: Procedure(s):  COLONOSCOPY, FLEXIBLE, WITH LESION REMOVAL USING SNARE       Anesthesia Type:  MAC    Note:  Disposition: Outpatient   Postop Pain Control: Uneventful            Sign Out: Well controlled pain   PONV: No   Neuro/Psych: Uneventful            Sign Out: Acceptable/Baseline neuro status   Airway/Respiratory: Uneventful            Sign Out: Acceptable/Baseline resp. status   CV/Hemodynamics: Uneventful            Sign Out: Acceptable CV status   Other NRE: NONE   DID A NON-ROUTINE EVENT OCCUR? No    Event details/Postop Comments:  Pt was happy with anesthesia care.  No complications.  I will follow up with the pt if needed.           Last vitals:  Vitals Value Taken Time   BP 80/55 10/17/24 0950   Temp     Pulse 75 10/17/24 0950   Resp     SpO2 99 % 10/17/24 1010       Electronically Signed By: KATARINA Smith CRNA  October 17, 2024  10:17 AM

## 2024-10-17 NOTE — H&P
Patient seen for Endoscopy    HPI:  Patient is a 24 year old female here for endoscopy. Not taking blood thinning medications. No MI or CVA history. No issues with previous sedation. No recent acute illness.    Review Of Systems    Skin: negative  Ears/Nose/Throat: negative  Respiratory: No shortness of breath, dyspnea on exertion, cough, or hemoptysis  Cardiovascular: negative  Gastrointestinal: negative  Genitourinary: negative  Musculoskeletal: negative  Neurologic: negative  Hematologic/Lymphatic/Immunologic: negative  Endocrine: negative      History reviewed. No pertinent past medical history.    Past Surgical History:   Procedure Laterality Date    Morris Teeth Extraction  2016       History reviewed. No pertinent family history.    Social History     Socioeconomic History    Marital status: Single     Spouse name: Not on file    Number of children: Not on file    Years of education: Not on file    Highest education level: Not on file   Occupational History    Not on file   Tobacco Use    Smoking status: Never     Passive exposure: Never    Smokeless tobacco: Never   Vaping Use    Vaping status: Never Used   Substance and Sexual Activity    Alcohol use: Not on file    Drug use: Not on file    Sexual activity: Not on file   Other Topics Concern    Not on file   Social History Narrative    Not on file     Social Determinants of Health     Financial Resource Strain: Low Risk  (1/17/2024)    Financial Resource Strain     Within the past 12 months, have you or your family members you live with been unable to get utilities (heat, electricity) when it was really needed?: No   Food Insecurity: Low Risk  (1/17/2024)    Food Insecurity     Within the past 12 months, did you worry that your food would run out before you got money to buy more?: No     Within the past 12 months, did the food you bought just not last and you didn t have money to get more?: No   Transportation Needs: Low Risk  (1/17/2024)    Transportation  "Needs     Within the past 12 months, has lack of transportation kept you from medical appointments, getting your medicines, non-medical meetings or appointments, work, or from getting things that you need?: No   Physical Activity: Not on file   Stress: Not on file   Social Connections: Not on file   Interpersonal Safety: Low Risk  (10/17/2024)    Interpersonal Safety     Do you feel physically and emotionally safe where you currently live?: Yes     Within the past 12 months, have you been hit, slapped, kicked or otherwise physically hurt by someone?: No     Within the past 12 months, have you been humiliated or emotionally abused in other ways by your partner or ex-partner?: No   Housing Stability: High Risk (1/17/2024)    Housing Stability     Do you have housing? : No     Are you worried about losing your housing?: No       No current outpatient medications on file.       Medications and history reviewed    Physical exam:  Vitals: /64   Pulse 80   Temp 98.3  F (36.8  C) (Temporal)   Resp 16   Ht 1.66 m (5' 5.35\")   Wt 66.7 kg (147 lb)   LMP 08/24/2024 (Exact Date)   SpO2 100%   BMI 24.20 kg/m    BMI= Body mass index is 24.2 kg/m .    Constitutional: Healthy, alert, non-distressed   Head: Normo-cephalic, atraumatic, no lesions, masses or tenderness   Cardiovascular: RRR, no new murmurs, +S1, +S2 heart sounds, no clicks, rubs or gallops   Respiratory: CTAB, no rales, rhonchi or wheezing, equal chest rise, good respiratory effort   Gastrointestinal: Soft, non-tender, non distended, no rebound rigidity or guarding, no masses or hernias palpated   : Deferred  Musculoskeletal: Moves all extremities, normal  strength, no deformities noted   Skin: No suspicious lesions or rashes   Psychiatric: Mentation appears normal, affect appropriate   Hematologic/Lymphatic/Immunologic: Normal cervical and supraclavicular lymph nodes   Patient able to get up on table without difficulty.    Labs show:  No results " found for this or any previous visit (from the past 24 hour(s)).    Assessment: Endoscopy  Plan: Pt cleared for anesthesia for proposed procedure.    Edward Solis DO

## 2024-10-17 NOTE — ANESTHESIA POSTPROCEDURE EVALUATION
Patient: America Estrada    Procedure: Procedure(s):  COLONOSCOPY, FLEXIBLE, WITH LESION REMOVAL USING SNARE       Anesthesia Type:  MAC    Note:  Disposition: Outpatient   Postop Pain Control: Uneventful            Sign Out: Well controlled pain   PONV: No   Neuro/Psych: Uneventful            Sign Out: Acceptable/Baseline neuro status   Airway/Respiratory: Uneventful            Sign Out: Acceptable/Baseline resp. status   CV/Hemodynamics: Uneventful            Sign Out: Acceptable CV status   Other NRE: NONE   DID A NON-ROUTINE EVENT OCCUR? No    Event details/Postop Comments:  Pt was happy with anesthesia care.  No complications.  I will follow up with the pt if needed.           Last vitals:  Vitals Value Taken Time   BP 80/55 10/17/24 0950   Temp     Pulse 75 10/17/24 0950   Resp     SpO2 99 % 10/17/24 1010       Electronically Signed By: KATARINA Smith CRNA  October 17, 2024  11:48 AM

## 2024-10-18 LAB
PATH REPORT.COMMENTS IMP SPEC: NORMAL
PATH REPORT.COMMENTS IMP SPEC: NORMAL
PATH REPORT.FINAL DX SPEC: NORMAL
PATH REPORT.GROSS SPEC: NORMAL
PATH REPORT.MICROSCOPIC SPEC OTHER STN: NORMAL
PATH REPORT.RELEVANT HX SPEC: NORMAL
PHOTO IMAGE: NORMAL

## 2024-10-31 ENCOUNTER — PATIENT OUTREACH (OUTPATIENT)
Dept: GASTROENTEROLOGY | Facility: CLINIC | Age: 24
End: 2024-10-31
Payer: COMMERCIAL

## 2025-03-05 ENCOUNTER — TELEPHONE (OUTPATIENT)
Dept: FAMILY MEDICINE | Facility: CLINIC | Age: 25
End: 2025-03-05
Payer: COMMERCIAL

## 2025-03-05 NOTE — TELEPHONE ENCOUNTER
Patient Quality Outreach    Patient is due for the following:   Cervical Cancer Screening - PAP Needed  Physical Preventive Adult Physical      Topic Date Due    HPV Vaccine (1 - 3-dose series) Never done    Diptheria Tetanus Pertussis (DTAP/TDAP/TD) Vaccine (6 - Td or Tdap) 09/16/2021    Flu Vaccine (1) 09/01/2024    COVID-19 Vaccine (3 - 2024-25 season) 09/01/2024       Action(s) Taken:   Schedule a Adult Preventative    Type of outreach:    Sent [employer] message.    Questions for provider review:    None           Nat Thomas CMA  Chart routed to Care Team.

## 2025-06-04 ENCOUNTER — TELEPHONE (OUTPATIENT)
Dept: FAMILY MEDICINE | Facility: CLINIC | Age: 25
End: 2025-06-04
Payer: COMMERCIAL

## 2025-06-04 NOTE — TELEPHONE ENCOUNTER
Patient Quality Outreach    Patient is due for the following:   Physical Preventive Adult Physical      Topic Date Due    HPV Vaccine (1 - 3-dose series) Never done    Diptheria Tetanus Pertussis (DTAP/TDAP/TD) Vaccine (6 - Td or Tdap) 09/16/2021    COVID-19 Vaccine (3 - 2024-25 season) 09/01/2024     Hypotension     Action(s) Taken:   Schedule a Annual Wellness Visit    Type of outreach:    Sent [employer] message.    Questions for provider review:    None         Nat Thomas CMA  Chart routed to Care Team.

## 2025-07-02 ENCOUNTER — OFFICE VISIT (OUTPATIENT)
Dept: FAMILY MEDICINE | Facility: CLINIC | Age: 25
End: 2025-07-02

## 2025-07-02 VITALS
BODY MASS INDEX: 26.42 KG/M2 | HEIGHT: 66 IN | RESPIRATION RATE: 16 BRPM | HEART RATE: 88 BPM | WEIGHT: 164.4 LBS | OXYGEN SATURATION: 100 % | TEMPERATURE: 98.9 F | SYSTOLIC BLOOD PRESSURE: 110 MMHG | DIASTOLIC BLOOD PRESSURE: 64 MMHG

## 2025-07-02 DIAGNOSIS — L03.211 FACIAL CELLULITIS: Primary | ICD-10-CM

## 2025-07-02 PROCEDURE — 99213 OFFICE O/P EST LOW 20 MIN: CPT | Performed by: PHYSICIAN ASSISTANT

## 2025-07-02 RX ORDER — SULFAMETHOXAZOLE AND TRIMETHOPRIM 800; 160 MG/1; MG/1
1 TABLET ORAL 2 TIMES DAILY
Qty: 14 TABLET | Refills: 0 | Status: SHIPPED | OUTPATIENT
Start: 2025-07-02 | End: 2025-07-09

## 2025-07-02 RX ORDER — CEFADROXIL 500 MG/1
500 CAPSULE ORAL 2 TIMES DAILY
Qty: 14 CAPSULE | Refills: 0 | Status: SHIPPED | OUTPATIENT
Start: 2025-07-02 | End: 2025-07-09

## 2025-07-02 ASSESSMENT — PAIN SCALES - GENERAL: PAINLEVEL_OUTOF10: NO PAIN (0)

## 2025-07-02 NOTE — PROGRESS NOTES
"  Assessment & Plan   Problem List Items Addressed This Visit    None  Visit Diagnoses         Facial cellulitis    -  Primary    Relevant Medications    cefadroxil (DURICEF) 500 MG capsule    sulfamethoxazole-trimethoprim (BACTRIM DS) 800-160 MG tablet           It is my impression based on the historical events and the physical exam that this is facial cellulitis.  I do not believe the patient has underlying osteomyelitis, sinusitis, abscess, angioedema, or necrotizing fasciitis. Appears well and non-toxic and I have low suspicion for impending airway obstruction or respiratory distress. Treat with cefadroxil and if not improving in 2 days, she will add Bactrim for MRSA coverage. Otc analgesics and topical antibiotics to lesion. Primary care referral placed to establish care and have an annual. She declined TDAP today despite my recommendation.    Complete history and physical exam as below. Elevated temp with otherwise normal vital signs.    DDx and Dx discussed with and explained to the pt and the parent to their satisfaction.  All questions were answered at this time. Pt and parent expressed understanding of and agreement with this dx, tx, and plan. No further workup warranted and standard medication warnings given. I have given the patient and parent a list of pertinent indications for re-evaluation. Will go to the Emergency Department if symptoms worsen or new concerning symptoms arise. Patient left with parent in no apparent distress.      BMI  Estimated body mass index is 26.45 kg/m  as calculated from the following:    Height as of this encounter: 1.679 m (5' 6.1\").    Weight as of this encounter: 74.6 kg (164 lb 6.4 oz).     Follow-up  Return in about 1 week (around 7/9/2025) for a recheck of your symptoms if not improving, or call 911/go to an ER anytime if worsening.    John Cross is a 25 year old, presenting for the following health issues:  Swelling        7/2/2025     8:49 AM   Additional " "Questions   Roomed by Cj Brown CMA   Accompanied by Mom         7/2/2025     8:49 AM   Patient Reported Additional Medications   Patient reports taking the following new medications No new medications     History of Present Illness       Reason for visit:  Swelling  Symptom onset:  1-3 days ago  Symptoms include:  Swollen head  Symptom intensity:  Moderate  Symptom progression:  Worsening  Had these symptoms before:  No   She is taking medications regularly.   America Estrada, 25 years, female    - Scab present for several weeks, initially caused by plucking a hair  - Swelling began yesterday, July 1, 2025, with tenderness behind the scab  - Headache described as tight at the top of the head  - No pus drainage, but clear fluid expressed after squeezing  - Recent respiratory illness with congestion, cough, and cold symptoms lasting 3 days, [place]  Patient is coming in today due to a scab on her forehead that has caused swelling that started yesterday (Patient also got sunburned as well).  There is pain behind the scab area and a headache.  Patient notes no injury, no bleeding, and no pus noted.      Review of Systems  Constitutional, derm, HEENT, cardiovascular, pulmonary, gi and gu systems are negative, except as otherwise noted.      Objective    /64   Pulse 88   Temp 98.9  F (37.2  C) (Temporal)   Resp 16   Ht 1.679 m (5' 6.1\")   Wt 74.6 kg (164 lb 6.4 oz)   LMP 06/28/2025 (Exact Date)   SpO2 100%   BMI 26.45 kg/m    Body mass index is 26.45 kg/m .  Physical Exam  Vitals and nursing note reviewed.   Constitutional:       General: She is not in acute distress.     Appearance: She is not ill-appearing or diaphoretic.   HENT:      Head: Normocephalic.      Comments: ~10cm area of edema and faint erythema to the forehead with 1mm diameter excoriated area to the central hairline with red crust. No monteiro signor raccoon eyes. Skull and facial bones " otherwise non-tender.   No other lesions, drainage, or other overlying signs of trauma or infection.     Nose: Nose normal.      Mouth/Throat:      Mouth: Mucous membranes are moist.      Pharynx: Oropharynx is clear.   Eyes:      General: No scleral icterus.        Right eye: No discharge.         Left eye: No discharge.      Extraocular Movements: Extraocular movements intact.      Conjunctiva/sclera: Conjunctivae normal.      Pupils: Pupils are equal, round, and reactive to light.   Cardiovascular:      Rate and Rhythm: Normal rate and regular rhythm.      Heart sounds: Normal heart sounds. No murmur heard.     No friction rub. No gallop.   Pulmonary:      Effort: Pulmonary effort is normal. No respiratory distress.      Breath sounds: Normal breath sounds. No stridor. No wheezing, rhonchi or rales.   Musculoskeletal:      Cervical back: Neck supple. No rigidity.   Lymphadenopathy:      Cervical: No cervical adenopathy.   Skin:     General: Skin is warm and dry.   Neurological:      General: No focal deficit present.      Mental Status: She is alert. Mental status is at baseline.   Psychiatric:         Mood and Affect: Mood normal.         Behavior: Behavior normal.        Photo taken with patient's permission:        Signed Electronically by: SUDHIR Gilbert

## 2025-07-02 NOTE — PATIENT INSTRUCTIONS
Sydney Cross,    Thank you for allowing [facility] to manage your care.    This is likely a skin infection called cellulitis. The antibiotics will help.    If you develop worsening/changing symptoms at any time, please be seen in clinic/urgent care or call 911/go to the emergency department for evaluation as we discussed.    I sent your prescriptions to your pharmacy. Take a probiotic pill, eat live culture yogurt (Greek yogurt or Activia), drink kefir daily for one week after finishing the antibiotics to encourage growth of good bacteria in your system.    I made a referral to get a primary provider. Please call the specialty number on your after visit summary.     Please allow 1-2 business days for our office to contact you in regards to your laboratory/radiological studies.  If not done so, I encourage you to login into [email or web address] to review your results as well.     If you have any questions or concerns, please feel free to call us at [phone]    Sincerely,    Jm Nash PA-C    Did you know?      You can schedule a video visit for follow-up appointments as well as future appointments for certain conditions.  Please see the below link.     [email or web address]    If you have not already done so,  I encourage you to sign up for [employer] ([email or web address]).  This will allow you to review your results, securely communicate with a provider, and schedule virtual visits as well.  amanda

## (undated) DEVICE — KIT ENDO TURNOVER/PROCEDURE CARRY-ON 101822

## (undated) DEVICE — ENDO SNARE EXACTO COLD 9MM LOOP 2.4MMX230CM 00711115

## (undated) DEVICE — SOL WATER IRRIG 1000ML BOTTLE 2F7114

## (undated) DEVICE — TUBING SUCTION 6"X3/16" N56A

## (undated) DEVICE — ENDO TRAP POLYP E-TRAP 00711099